# Patient Record
Sex: MALE | Race: BLACK OR AFRICAN AMERICAN | NOT HISPANIC OR LATINO | Employment: PART TIME | URBAN - METROPOLITAN AREA
[De-identification: names, ages, dates, MRNs, and addresses within clinical notes are randomized per-mention and may not be internally consistent; named-entity substitution may affect disease eponyms.]

---

## 2018-09-04 ENCOUNTER — HISTORICAL (OUTPATIENT)
Dept: ADMINISTRATIVE | Facility: HOSPITAL | Age: 23
End: 2018-09-04

## 2018-09-04 LAB
ABS NEUT (OLG): 2.44 X10(3)/MCL (ref 2.1–9.2)
ALBUMIN SERPL-MCNC: 4.6 GM/DL (ref 3.4–5)
ALBUMIN/GLOB SERPL: 1 RATIO (ref 1–2)
ALP SERPL-CCNC: 84 UNIT/L (ref 45–117)
ALT SERPL-CCNC: 53 UNIT/L (ref 12–78)
APPEARANCE, UA: CLEAR
AST SERPL-CCNC: 24 UNIT/L (ref 15–37)
BACTERIA #/AREA URNS AUTO: ABNORMAL /[HPF]
BASOPHILS # BLD AUTO: 0.04 X10(3)/MCL
BASOPHILS NFR BLD AUTO: 1 %
BILIRUB SERPL-MCNC: 0.6 MG/DL (ref 0.2–1)
BILIRUB UR QL STRIP: NEGATIVE
BILIRUBIN DIRECT+TOT PNL SERPL-MCNC: 0.2 MG/DL
BILIRUBIN DIRECT+TOT PNL SERPL-MCNC: 0.4 MG/DL
BUN SERPL-MCNC: 10 MG/DL (ref 7–18)
CALCIUM SERPL-MCNC: 9 MG/DL (ref 8.5–10.1)
CHLORIDE SERPL-SCNC: 102 MMOL/L (ref 98–107)
CHOLEST SERPL-MCNC: 200 MG/DL
CHOLEST/HDLC SERPL: 4.4 {RATIO} (ref 0–5)
CO2 SERPL-SCNC: 24 MMOL/L (ref 21–32)
COLOR UR: YELLOW
CREAT SERPL-MCNC: 0.9 MG/DL (ref 0.6–1.3)
EOSINOPHIL # BLD AUTO: 0.4 X10(3)/MCL
EOSINOPHIL NFR BLD AUTO: 7 %
ERYTHROCYTE [DISTWIDTH] IN BLOOD BY AUTOMATED COUNT: 12.5 % (ref 11.5–14.5)
EST. AVERAGE GLUCOSE BLD GHB EST-MCNC: 111 MG/DL
GLOBULIN SER-MCNC: 3.7 GM/ML (ref 2.3–3.5)
GLUCOSE (UA): NORMAL
GLUCOSE SERPL-MCNC: 94 MG/DL (ref 74–106)
HBA1C MFR BLD: 5.5 % (ref 4.2–6.3)
HCT VFR BLD AUTO: 42.9 % (ref 40–51)
HDLC SERPL-MCNC: 45 MG/DL
HGB BLD-MCNC: 14.9 GM/DL (ref 13.5–17.5)
HGB UR QL STRIP: NEGATIVE
HIV 1+2 AB+HIV1 P24 AG SERPL QL IA: NONREACTIVE
HYALINE CASTS #/AREA URNS LPF: ABNORMAL /[LPF]
IMM GRANULOCYTES # BLD AUTO: 0.03 10*3/UL
IMM GRANULOCYTES NFR BLD AUTO: 0 %
KETONES UR QL STRIP: NEGATIVE
LDLC SERPL CALC-MCNC: 127 MG/DL (ref 0–130)
LEUKOCYTE ESTERASE UR QL STRIP: NEGATIVE
LYMPHOCYTES # BLD AUTO: 2.45 X10(3)/MCL
LYMPHOCYTES NFR BLD AUTO: 42 % (ref 13–40)
MCH RBC QN AUTO: 30.3 PG (ref 26–34)
MCHC RBC AUTO-ENTMCNC: 34.7 GM/DL (ref 31–37)
MCV RBC AUTO: 87.4 FL (ref 80–100)
MONOCYTES # BLD AUTO: 0.52 X10(3)/MCL
MONOCYTES NFR BLD AUTO: 9 % (ref 4–12)
NEUTROPHILS # BLD AUTO: 2.44 X10(3)/MCL
NEUTROPHILS NFR BLD AUTO: 42 X10(3)/MCL
NITRITE UR QL STRIP: NEGATIVE
PH UR STRIP: 6 [PH] (ref 4.5–8)
PLATELET # BLD AUTO: 204 X10(3)/MCL (ref 130–400)
PMV BLD AUTO: 8.9 FL (ref 7.4–10.4)
POTASSIUM SERPL-SCNC: 3.7 MMOL/L (ref 3.5–5.1)
PROT SERPL-MCNC: 8.3 GM/DL (ref 6.4–8.2)
PROT UR QL STRIP: NEGATIVE
RBC # BLD AUTO: 4.91 X10(6)/MCL (ref 4.5–5.9)
RBC #/AREA URNS AUTO: ABNORMAL /[HPF]
SODIUM SERPL-SCNC: 138 MMOL/L (ref 136–145)
SP GR UR STRIP: 1.02 (ref 1–1.03)
SQUAMOUS #/AREA URNS LPF: ABNORMAL /[LPF]
T PALLIDUM AB SER QL: NONREACTIVE
T4 FREE SERPL-MCNC: 0.98 NG/DL (ref 0.76–1.46)
TRIGL SERPL-MCNC: 140 MG/DL
TSH SERPL-ACNC: 1.74 MIU/L (ref 0.36–3.74)
UROBILINOGEN UR STRIP-ACNC: NORMAL
VLDLC SERPL CALC-MCNC: 28 MG/DL
WBC # SPEC AUTO: 5.9 X10(3)/MCL (ref 4.5–11)
WBC #/AREA URNS AUTO: ABNORMAL /HPF

## 2018-12-11 ENCOUNTER — HISTORICAL (OUTPATIENT)
Dept: ADMINISTRATIVE | Facility: HOSPITAL | Age: 23
End: 2018-12-11

## 2018-12-11 LAB
ALBUMIN SERPL-MCNC: 4.7 GM/DL (ref 3.4–5)
ALBUMIN/GLOB SERPL: 1 RATIO (ref 1–2)
ALP SERPL-CCNC: 95 UNIT/L (ref 45–117)
ALT SERPL-CCNC: 33 UNIT/L (ref 12–78)
AST SERPL-CCNC: 14 UNIT/L (ref 15–37)
BILIRUB SERPL-MCNC: 0.8 MG/DL (ref 0.2–1)
BILIRUBIN DIRECT+TOT PNL SERPL-MCNC: 0.2 MG/DL
BILIRUBIN DIRECT+TOT PNL SERPL-MCNC: 0.6 MG/DL
BUN SERPL-MCNC: 11 MG/DL (ref 7–18)
CALCIUM SERPL-MCNC: 9.7 MG/DL (ref 8.5–10.1)
CHLORIDE SERPL-SCNC: 106 MMOL/L (ref 98–107)
CO2 SERPL-SCNC: 28 MMOL/L (ref 21–32)
CREAT SERPL-MCNC: 1.1 MG/DL (ref 0.6–1.3)
GLOBULIN SER-MCNC: 3.9 GM/ML (ref 2.3–3.5)
GLUCOSE SERPL-MCNC: 88 MG/DL (ref 74–106)
MAGNESIUM SERPL-MCNC: 2.1 MG/DL (ref 1.8–2.4)
POTASSIUM SERPL-SCNC: 4.6 MMOL/L (ref 3.5–5.1)
PROT SERPL-MCNC: 8.6 GM/DL (ref 6.4–8.2)
SODIUM SERPL-SCNC: 138 MMOL/L (ref 136–145)
TSH SERPL-ACNC: 0.57 MIU/L (ref 0.36–3.74)

## 2019-09-20 ENCOUNTER — HISTORICAL (OUTPATIENT)
Dept: FAMILY MEDICINE | Facility: CLINIC | Age: 24
End: 2019-09-20

## 2019-09-20 LAB
ABS NEUT (OLG): 3.58 X10(3)/MCL (ref 2.1–9.2)
ALBUMIN SERPL-MCNC: 4.9 GM/DL (ref 3.4–5)
ALBUMIN/GLOB SERPL: 1.4 RATIO (ref 1.1–2)
ALP SERPL-CCNC: 87 UNIT/L (ref 45–117)
ALT SERPL-CCNC: 34 UNIT/L (ref 12–78)
AMPHET UR QL SCN: NEGATIVE
APPEARANCE, UA: CLEAR
AST SERPL-CCNC: 17 UNIT/L (ref 15–37)
BACTERIA #/AREA URNS AUTO: ABNORMAL /[HPF]
BARBITURATE SCN PRESENT UR: NEGATIVE
BASOPHILS # BLD AUTO: 0 X10(3)/MCL (ref 0–0.2)
BASOPHILS NFR BLD AUTO: 0 %
BENZODIAZ UR QL SCN: NEGATIVE
BILIRUB SERPL-MCNC: 0.5 MG/DL (ref 0.2–1)
BILIRUB UR QL STRIP: NEGATIVE
BILIRUBIN DIRECT+TOT PNL SERPL-MCNC: 0.1 MG/DL (ref 0–0.2)
BILIRUBIN DIRECT+TOT PNL SERPL-MCNC: 0.4 MG/DL
BUN SERPL-MCNC: 10 MG/DL (ref 7–18)
CALCIUM SERPL-MCNC: 10 MG/DL (ref 8.5–10.1)
CANNABINOIDS UR QL SCN: POSITIVE
CHLORIDE SERPL-SCNC: 108 MMOL/L (ref 98–107)
CO2 SERPL-SCNC: 27 MMOL/L (ref 21–32)
COCAINE UR QL SCN: NEGATIVE
COLOR UR: NORMAL
CREAT SERPL-MCNC: 0.9 MG/DL (ref 0.6–1.3)
CREAT UR-MCNC: 133 MG/DL
EOSINOPHIL # BLD AUTO: 0.2 X10(3)/MCL (ref 0–0.9)
EOSINOPHIL NFR BLD AUTO: 3 %
ERYTHROCYTE [DISTWIDTH] IN BLOOD BY AUTOMATED COUNT: 12.3 % (ref 11.5–14.5)
GLOBULIN SER-MCNC: 3.4 GM/ML (ref 2.3–3.5)
GLUCOSE (UA): NORMAL
GLUCOSE SERPL-MCNC: 78 MG/DL (ref 74–106)
HAV IGM SERPL QL IA: NONREACTIVE
HBV CORE IGM SERPL QL IA: NONREACTIVE
HBV SURFACE AG SERPL QL IA: NEGATIVE
HCT VFR BLD AUTO: 40.5 % (ref 40–51)
HCV AB SERPL QL IA: NONREACTIVE
HGB BLD-MCNC: 13.4 GM/DL (ref 13.5–17.5)
HGB UR QL STRIP: NEGATIVE
HIV 1+2 AB+HIV1 P24 AG SERPL QL IA: NONREACTIVE
HYALINE CASTS #/AREA URNS LPF: ABNORMAL /[LPF]
IMM GRANULOCYTES # BLD AUTO: 0.03 10*3/UL
IMM GRANULOCYTES NFR BLD AUTO: 0 %
KETONES UR QL STRIP: NEGATIVE
LEUKOCYTE ESTERASE UR QL STRIP: NEGATIVE
LYMPHOCYTES # BLD AUTO: 2.2 X10(3)/MCL (ref 0.6–4.6)
LYMPHOCYTES NFR BLD AUTO: 33 %
MCH RBC QN AUTO: 29.4 PG (ref 26–34)
MCHC RBC AUTO-ENTMCNC: 33.1 GM/DL (ref 31–37)
MCV RBC AUTO: 88.8 FL (ref 80–100)
MONOCYTES # BLD AUTO: 0.6 X10(3)/MCL (ref 0.1–1.3)
MONOCYTES NFR BLD AUTO: 9 %
NEUTROPHILS # BLD AUTO: 3.58 X10(3)/MCL (ref 2.1–9.2)
NEUTROPHILS NFR BLD AUTO: 54 %
NITRITE UR QL STRIP: NEGATIVE
OPIATES UR QL SCN: NEGATIVE
PCP UR QL: NEGATIVE
PH UR STRIP.AUTO: 7 [PH] (ref 5–8)
PH UR STRIP: 7 [PH] (ref 4.5–8)
PLATELET # BLD AUTO: 240 X10(3)/MCL (ref 130–400)
PMV BLD AUTO: 8.8 FL (ref 7.4–10.4)
POTASSIUM SERPL-SCNC: 3.8 MMOL/L (ref 3.5–5.1)
PROT SERPL-MCNC: 8.3 GM/DL (ref 6.4–8.2)
PROT UR QL STRIP: NEGATIVE
PROT UR STRIP-MCNC: 7.6 MG/DL
PROT/CREAT UR-RTO: 57.1 MG/GM
RBC # BLD AUTO: 4.56 X10(6)/MCL (ref 4.5–5.9)
RBC #/AREA URNS AUTO: ABNORMAL /[HPF]
SODIUM SERPL-SCNC: 141 MMOL/L (ref 136–145)
SP GR UR STRIP: 1.01 (ref 1–1.03)
SQUAMOUS #/AREA URNS LPF: ABNORMAL /[LPF]
T PALLIDUM AB SER QL: NONREACTIVE
TEMPERATURE, URINE (OHS): 25 DEGC (ref 20–25)
TSH SERPL-ACNC: 0.91 MIU/L (ref 0.36–3.74)
UROBILINOGEN UR STRIP-ACNC: NORMAL
WBC # SPEC AUTO: 6.7 X10(3)/MCL (ref 4.5–11)
WBC #/AREA URNS AUTO: ABNORMAL /HPF

## 2020-11-13 ENCOUNTER — HISTORICAL (OUTPATIENT)
Dept: ADMINISTRATIVE | Facility: HOSPITAL | Age: 25
End: 2020-11-13

## 2020-11-13 LAB
ABS NEUT (OLG): 3.18 X10(3)/MCL (ref 2.1–9.2)
ALBUMIN SERPL-MCNC: 4.6 GM/DL (ref 3.5–5)
ALBUMIN/GLOB SERPL: 1.6 RATIO (ref 1.1–2)
ALP SERPL-CCNC: 63 UNIT/L (ref 40–150)
ALT SERPL-CCNC: 22 UNIT/L (ref 0–55)
AST SERPL-CCNC: 20 UNIT/L (ref 5–34)
BASOPHILS # BLD AUTO: 0 X10(3)/MCL (ref 0–0.2)
BASOPHILS NFR BLD AUTO: 1 %
BILIRUB SERPL-MCNC: 0.6 MG/DL
BILIRUBIN DIRECT+TOT PNL SERPL-MCNC: 0.2 MG/DL (ref 0–0.5)
BILIRUBIN DIRECT+TOT PNL SERPL-MCNC: 0.4 MG/DL (ref 0–0.8)
BUN SERPL-MCNC: 13 MG/DL (ref 8.9–20.6)
CALCIUM SERPL-MCNC: 9.5 MG/DL (ref 8.4–10.2)
CHLORIDE SERPL-SCNC: 107 MMOL/L (ref 98–107)
CHOLEST SERPL-MCNC: 166 MG/DL
CHOLEST/HDLC SERPL: 4 {RATIO} (ref 0–5)
CO2 SERPL-SCNC: 27 MMOL/L (ref 22–29)
CREAT SERPL-MCNC: 0.88 MG/DL (ref 0.73–1.18)
EOSINOPHIL # BLD AUTO: 0.3 X10(3)/MCL (ref 0–0.9)
EOSINOPHIL NFR BLD AUTO: 5 %
ERYTHROCYTE [DISTWIDTH] IN BLOOD BY AUTOMATED COUNT: 12.8 % (ref 11.5–14.5)
GLOBULIN SER-MCNC: 2.9 GM/DL (ref 2.4–3.5)
GLUCOSE SERPL-MCNC: 87 MG/DL (ref 74–100)
HCT VFR BLD AUTO: 40.1 % (ref 40–51)
HDLC SERPL-MCNC: 39 MG/DL (ref 35–60)
HGB BLD-MCNC: 13.3 GM/DL (ref 13.5–17.5)
HIV 1+2 AB+HIV1 P24 AG SERPL QL IA: NONREACTIVE
IMM GRANULOCYTES # BLD AUTO: 0.02 10*3/UL
IMM GRANULOCYTES NFR BLD AUTO: 0 %
LDLC SERPL CALC-MCNC: 91 MG/DL (ref 50–140)
LYMPHOCYTES # BLD AUTO: 2.2 X10(3)/MCL (ref 0.6–4.6)
LYMPHOCYTES NFR BLD AUTO: 34 %
MCH RBC QN AUTO: 30.1 PG (ref 26–34)
MCHC RBC AUTO-ENTMCNC: 33.2 GM/DL (ref 31–37)
MCV RBC AUTO: 90.7 FL (ref 80–100)
MONOCYTES # BLD AUTO: 0.8 X10(3)/MCL (ref 0.1–1.3)
MONOCYTES NFR BLD AUTO: 12 %
NEUTROPHILS # BLD AUTO: 3.18 X10(3)/MCL (ref 2.1–9.2)
NEUTROPHILS NFR BLD AUTO: 48 %
PLATELET # BLD AUTO: 227 X10(3)/MCL (ref 130–400)
PMV BLD AUTO: 9.5 FL (ref 7.4–10.4)
POTASSIUM SERPL-SCNC: 4.2 MMOL/L (ref 3.5–5.1)
PROT SERPL-MCNC: 7.5 GM/DL (ref 6.4–8.3)
RBC # BLD AUTO: 4.42 X10(6)/MCL (ref 4.5–5.9)
SODIUM SERPL-SCNC: 142 MMOL/L (ref 136–145)
T PALLIDUM AB SER QL: NONREACTIVE
TRIGL SERPL-MCNC: 178 MG/DL (ref 34–140)
VLDLC SERPL CALC-MCNC: 36 MG/DL
WBC # SPEC AUTO: 6.6 X10(3)/MCL (ref 4.5–11)

## 2021-09-10 ENCOUNTER — HISTORICAL (OUTPATIENT)
Dept: ADMINISTRATIVE | Facility: HOSPITAL | Age: 26
End: 2021-09-10

## 2021-09-10 LAB
ALBUMIN SERPL-MCNC: 4.9 GM/DL (ref 3.5–5)
ALBUMIN/GLOB SERPL: 1.2 RATIO (ref 1.1–2)
ALP SERPL-CCNC: 92 UNIT/L (ref 40–150)
ALT SERPL-CCNC: 90 UNIT/L (ref 0–55)
AST SERPL-CCNC: 40 UNIT/L (ref 5–34)
BILIRUB SERPL-MCNC: 0.2 MG/DL
BILIRUBIN DIRECT+TOT PNL SERPL-MCNC: 0.1 MG/DL (ref 0–0.5)
BILIRUBIN DIRECT+TOT PNL SERPL-MCNC: 0.1 MG/DL (ref 0–0.8)
BUN SERPL-MCNC: 13.5 MG/DL (ref 8.9–20.6)
CALCIUM SERPL-MCNC: 10.9 MG/DL (ref 8.4–10.2)
CHLORIDE SERPL-SCNC: 105 MMOL/L (ref 98–107)
CO2 SERPL-SCNC: 27 MMOL/L (ref 22–29)
CREAT SERPL-MCNC: 0.83 MG/DL (ref 0.73–1.18)
GLOBULIN SER-MCNC: 4 GM/DL (ref 2.4–3.5)
GLUCOSE SERPL-MCNC: 96 MG/DL (ref 74–100)
HAV IGM SERPL QL IA: NONREACTIVE
HBV CORE IGM SERPL QL IA: NONREACTIVE
HBV SURFACE AG SERPL QL IA: NONREACTIVE
HCV AB SERPL QL IA: NONREACTIVE
HIV 1+2 AB+HIV1 P24 AG SERPL QL IA: NONREACTIVE
POTASSIUM SERPL-SCNC: 4.5 MMOL/L (ref 3.5–5.1)
PROT SERPL-MCNC: 8.9 GM/DL (ref 6.4–8.3)
PTH-INTACT SERPL-MCNC: 37 PG/ML (ref 8.7–77)
RPR SER QL: REACTIVE
SODIUM SERPL-SCNC: 140 MMOL/L (ref 136–145)
T PALLIDUM AB SER QL: REACTIVE
T3FREE SERPL-MCNC: 3.24 PG/ML (ref 1.71–3.71)
T4 FREE SERPL-MCNC: 0.86 NG/DL (ref 0.7–1.48)
TSH SERPL-ACNC: 0.18 UIU/ML (ref 0.35–4.94)

## 2021-09-13 ENCOUNTER — HISTORICAL (OUTPATIENT)
Dept: ADMINISTRATIVE | Facility: HOSPITAL | Age: 26
End: 2021-09-13

## 2021-09-13 LAB — PROT 24H UR-MCNC: 212 MG/24HR (ref 0–165)

## 2021-09-29 ENCOUNTER — HISTORICAL (OUTPATIENT)
Dept: RADIOLOGY | Facility: HOSPITAL | Age: 26
End: 2021-09-29

## 2021-10-05 ENCOUNTER — HISTORICAL (OUTPATIENT)
Dept: ADMINISTRATIVE | Facility: HOSPITAL | Age: 26
End: 2021-10-05

## 2021-10-05 LAB
ALBUMIN % SPEP (OHS): 52.9 % (ref 48.1–59.5)
ALBUMIN SERPL-MCNC: 5.1 GM/DL (ref 3.5–5)
ALBUMIN SERPL-MCNC: 5.2 GM/DL (ref 3.5–5)
ALBUMIN/GLOB SERPL: 1.5 RATIO (ref 1.1–2)
ALBUMIN/GLOB SERPL: 1.5 RATIO (ref 1.1–2)
ALP SERPL-CCNC: 83 UNIT/L (ref 40–150)
ALPHA 1 GLOB (OHS): 0.21 GM/DL (ref 0–0.4)
ALPHA 1 GLOB% (OHS): 2.42 % (ref 2.3–4.9)
ALPHA 2 GLOB % (OHS): 8.86 % (ref 6.9–13)
ALPHA 2 GLOB (OHS): 0.76 GM/DL (ref 0.4–1)
ALT SERPL-CCNC: 41 UNIT/L (ref 0–55)
APPEARANCE, UA: CLEAR
AST SERPL-CCNC: 24 UNIT/L (ref 5–34)
BACTERIA #/AREA URNS AUTO: ABNORMAL /HPF
BETA GLOB (OHS): 1.45 GM/DL (ref 0.7–1.3)
BETA GLOB% (OHS): 16.85 % (ref 13.8–19.7)
BILIRUB SERPL-MCNC: 0.5 MG/DL
BILIRUB UR QL STRIP: NEGATIVE
BILIRUBIN DIRECT+TOT PNL SERPL-MCNC: 0.2 MG/DL (ref 0–0.5)
BILIRUBIN DIRECT+TOT PNL SERPL-MCNC: 0.3 MG/DL (ref 0–0.8)
BUN SERPL-MCNC: 14.1 MG/DL (ref 8.9–20.6)
C3 SERPL-MCNC: 168 MG/DL (ref 80–173)
C4 SERPL-MCNC: 35.7 MG/DL (ref 13–46)
CALCIUM SERPL-MCNC: 11.8 MG/DL (ref 8.4–10.2)
CHLORIDE SERPL-SCNC: 104 MMOL/L (ref 98–107)
CO2 SERPL-SCNC: 26 MMOL/L (ref 22–29)
COLOR UR: NORMAL
CREAT SERPL-MCNC: 0.95 MG/DL (ref 0.73–1.18)
CREAT UR-MCNC: 112.7 MG/DL (ref 58–161)
GAMMA GLOBULIN % (OHS): 18.96 % (ref 10.1–21.9)
GAMMA GLOBULIN (OHS): 1.63 GM/DL (ref 0.4–1.8)
GLOBULIN SER-MCNC: 3.4 GM/DL (ref 2.4–3.5)
GLOBULIN SER-MCNC: 3.5 GM/DL (ref 2.4–3.5)
GLUCOSE (UA): NEGATIVE
GLUCOSE SERPL-MCNC: 86 MG/DL (ref 74–100)
HGB UR QL STRIP: NEGATIVE
HYALINE CASTS #/AREA URNS LPF: ABNORMAL /LPF
IFE INTERPRETATION (OHS): ABNORMAL
IGA SERPL-MCNC: 88 MG/DL (ref 63–484)
IGG SERPL-MCNC: 1453 MG/DL (ref 240–1822)
IGM SERPL-MCNC: 71 MG/DL (ref 22–240)
KETONES UR QL STRIP: NEGATIVE
LEUKOCYTE ESTERASE UR QL STRIP: NEGATIVE
M SPIKE % (OHS): ABNORMAL
M SPIKE (OHS): ABNORMAL
NITRITE UR QL STRIP: NEGATIVE
PEP GRAPH (OHS): ABNORMAL
PH UR STRIP: 5.5 [PH] (ref 4.5–8)
POTASSIUM SERPL-SCNC: 4.5 MMOL/L (ref 3.5–5.1)
PROT SERPL-MCNC: 8.6 GM/DL (ref 6.4–8.3)
PROT SERPL-MCNC: 8.6 GM/DL (ref 6.4–8.3)
PROT UR QL STRIP: NEGATIVE
PROT UR STRIP-MCNC: <6.8 MG/DL
PROT/CREAT UR-RTO: <60.3 MG/GM CR
RBC #/AREA URNS AUTO: ABNORMAL /HPF
SERINE PROT 3-ARUP: 2 AU/ML
SODIUM SERPL-SCNC: 141 MMOL/L (ref 136–145)
SP GR UR STRIP: 1.01 (ref 1–1.03)
SQUAMOUS #/AREA URNS LPF: ABNORMAL /LPF
UROBILINOGEN UR STRIP-ACNC: NORMAL
WBC #/AREA URNS AUTO: ABNORMAL /HPF

## 2021-10-11 ENCOUNTER — HISTORICAL (OUTPATIENT)
Dept: CARDIOLOGY | Facility: HOSPITAL | Age: 26
End: 2021-10-11

## 2022-02-08 ENCOUNTER — HISTORICAL (OUTPATIENT)
Dept: NEPHROLOGY | Facility: CLINIC | Age: 27
End: 2022-02-08

## 2022-02-08 LAB
ALBUMIN SERPL-MCNC: 5 G/DL (ref 3.5–5)
ALBUMIN/GLOB SERPL: 1.6 {RATIO} (ref 1.1–2)
ALP SERPL-CCNC: 69 U/L (ref 40–150)
ALT SERPL-CCNC: 32 U/L (ref 0–55)
AST SERPL-CCNC: 24 U/L (ref 5–34)
BILIRUB SERPL-MCNC: 0.6 MG/DL
BILIRUBIN DIRECT+TOT PNL SERPL-MCNC: 0.2 (ref 0–0.5)
BILIRUBIN DIRECT+TOT PNL SERPL-MCNC: 0.4 (ref 0–0.8)
BUN SERPL-MCNC: 11.5 MG/DL (ref 8.9–20.6)
CALCIUM SERPL-MCNC: 10.5 MG/DL (ref 8.7–10.5)
CHLORIDE SERPL-SCNC: 105 MMOL/L (ref 98–107)
CO2 SERPL-SCNC: 26 MMOL/L (ref 22–29)
CREAT SERPL-MCNC: 0.89 MG/DL (ref 0.73–1.18)
DEPRECATED CALCIDIOL+CALCIFEROL SERPL-MC: 10.1 NG/ML (ref 30–80)
GLOBULIN SER-MCNC: 3.1 G/DL (ref 2.4–3.5)
GLUCOSE SERPL-MCNC: 86 MG/DL (ref 74–100)
HEMOLYSIS INTERF INDEX SERPL-ACNC: 2
ICTERIC INTERF INDEX SERPL-ACNC: 1
LIPEMIC INTERF INDEX SERPL-ACNC: 6
POTASSIUM SERPL-SCNC: 4.1 MMOL/L (ref 3.5–5.1)
PROT SERPL-MCNC: 8.1 G/DL (ref 6.4–8.3)
PTH-INTACT SERPL-MCNC: 71 PG/ML (ref 8.7–77)
SODIUM SERPL-SCNC: 140 MMOL/L (ref 136–145)
T3FREE SERPL-MCNC: 3.06 PG/ML (ref 1.71–3.71)
T3RU NFR SERPL: 36.11 % (ref 31–39)
T4 FREE SERPL-MCNC: 1.01 NG/DL (ref 0.7–1.48)
TSH SERPL-ACNC: 1.16 M[IU]/L (ref 0.35–4.94)

## 2022-04-10 ENCOUNTER — HISTORICAL (OUTPATIENT)
Dept: ADMINISTRATIVE | Facility: HOSPITAL | Age: 27
End: 2022-04-10
Payer: MEDICAID

## 2022-04-25 ENCOUNTER — HISTORICAL (OUTPATIENT)
Dept: ADMINISTRATIVE | Facility: HOSPITAL | Age: 27
End: 2022-04-25
Payer: MEDICAID

## 2022-04-26 VITALS
DIASTOLIC BLOOD PRESSURE: 88 MMHG | HEIGHT: 71 IN | BODY MASS INDEX: 29.02 KG/M2 | WEIGHT: 207.25 LBS | OXYGEN SATURATION: 100 % | SYSTOLIC BLOOD PRESSURE: 130 MMHG

## 2022-05-26 ENCOUNTER — OFFICE VISIT (OUTPATIENT)
Dept: CARDIOLOGY | Facility: CLINIC | Age: 27
End: 2022-05-26
Payer: MEDICAID

## 2022-05-26 VITALS
OXYGEN SATURATION: 99 % | RESPIRATION RATE: 20 BRPM | WEIGHT: 201.06 LBS | DIASTOLIC BLOOD PRESSURE: 74 MMHG | SYSTOLIC BLOOD PRESSURE: 131 MMHG | TEMPERATURE: 98 F | HEART RATE: 68 BPM | BODY MASS INDEX: 28.15 KG/M2 | HEIGHT: 71 IN

## 2022-05-26 DIAGNOSIS — I10 HTN (HYPERTENSION), BENIGN: Primary | ICD-10-CM

## 2022-05-26 PROBLEM — Z72.0 TOBACCO USE: Status: ACTIVE | Noted: 2022-05-26

## 2022-05-26 PROBLEM — Z72.0 TOBACCO USE: Status: RESOLVED | Noted: 2022-05-26 | Resolved: 2022-05-26

## 2022-05-26 PROCEDURE — 4010F PR ACE/ARB THEARPY RXD/TAKEN: ICD-10-PCS | Mod: CPTII,,, | Performed by: NURSE PRACTITIONER

## 2022-05-26 PROCEDURE — 3075F SYST BP GE 130 - 139MM HG: CPT | Mod: CPTII,,, | Performed by: NURSE PRACTITIONER

## 2022-05-26 PROCEDURE — 1159F MED LIST DOCD IN RCRD: CPT | Mod: CPTII,,, | Performed by: NURSE PRACTITIONER

## 2022-05-26 PROCEDURE — 3008F PR BODY MASS INDEX (BMI) DOCUMENTED: ICD-10-PCS | Mod: CPTII,,, | Performed by: NURSE PRACTITIONER

## 2022-05-26 PROCEDURE — 1160F PR REVIEW ALL MEDS BY PRESCRIBER/CLIN PHARMACIST DOCUMENTED: ICD-10-PCS | Mod: CPTII,,, | Performed by: NURSE PRACTITIONER

## 2022-05-26 PROCEDURE — 1159F PR MEDICATION LIST DOCUMENTED IN MEDICAL RECORD: ICD-10-PCS | Mod: CPTII,,, | Performed by: NURSE PRACTITIONER

## 2022-05-26 PROCEDURE — 99214 OFFICE O/P EST MOD 30 MIN: CPT | Mod: PBBFAC | Performed by: NURSE PRACTITIONER

## 2022-05-26 PROCEDURE — 1160F RVW MEDS BY RX/DR IN RCRD: CPT | Mod: CPTII,,, | Performed by: NURSE PRACTITIONER

## 2022-05-26 PROCEDURE — 3078F PR MOST RECENT DIASTOLIC BLOOD PRESSURE < 80 MM HG: ICD-10-PCS | Mod: CPTII,,, | Performed by: NURSE PRACTITIONER

## 2022-05-26 PROCEDURE — 4010F ACE/ARB THERAPY RXD/TAKEN: CPT | Mod: CPTII,,, | Performed by: NURSE PRACTITIONER

## 2022-05-26 PROCEDURE — 3075F PR MOST RECENT SYSTOLIC BLOOD PRESS GE 130-139MM HG: ICD-10-PCS | Mod: CPTII,,, | Performed by: NURSE PRACTITIONER

## 2022-05-26 PROCEDURE — 3078F DIAST BP <80 MM HG: CPT | Mod: CPTII,,, | Performed by: NURSE PRACTITIONER

## 2022-05-26 PROCEDURE — 99214 PR OFFICE/OUTPT VISIT, EST, LEVL IV, 30-39 MIN: ICD-10-PCS | Mod: S$PBB,,, | Performed by: NURSE PRACTITIONER

## 2022-05-26 PROCEDURE — 3008F BODY MASS INDEX DOCD: CPT | Mod: CPTII,,, | Performed by: NURSE PRACTITIONER

## 2022-05-26 PROCEDURE — 99214 OFFICE O/P EST MOD 30 MIN: CPT | Mod: S$PBB,,, | Performed by: NURSE PRACTITIONER

## 2022-05-26 RX ORDER — AMLODIPINE BESYLATE 5 MG/1
5 TABLET ORAL DAILY
COMMUNITY
End: 2023-03-07

## 2022-05-26 RX ORDER — VALSARTAN 160 MG/1
160 TABLET ORAL DAILY
COMMUNITY
End: 2023-03-07

## 2022-05-26 RX ORDER — METOPROLOL SUCCINATE 25 MG/1
25 TABLET, EXTENDED RELEASE ORAL DAILY
COMMUNITY
End: 2023-03-31 | Stop reason: SDUPTHER

## 2022-05-26 NOTE — PROGRESS NOTES
CHIEF COMPLAINT:   Chief Complaint   Patient presents with    f/u visit, denies chest pains or sob    Hypertension                   Review of patient's allergies indicates:  No Known Allergies                                       HPI:  Dread Gustafson 26 y.o. -American male with a history of hypertension, and history of palpitations. The patient had a 24 hour Holter monitor that revealed average HR 93 NSR, sinus tachycardia 31% of the time, SVT.  Subsequently, the patient was initiated on metoprolol during his last clinic visit.  An echocardiogram completed on 09/29/2021 reveal an intact ejection fraction of 55%, borderline concentric left ventricle hypertrophy.    Today the patient presents without any cardiac complaints of chest pain, shortness of breath, palpitations, orthopnea, PND, peripheral edema, lightheadedness, dizziness or syncope. He states he is able to perform his normal ADLs and regular housework without ischemic symtpoms.  He denies any issues since last seen in cardio clinic.  He reports compliance with his current medications.    9/29 TTE with EF 55%, borderline concentric left ventricular hypertrophy, RVSP 25, trace to mild mitral regurg    10/11/2021 24-hr holter monitor interpretation    Sinus rhythm  Average Vent. rate elevated at 93  Sinus tachycardia 31% of the time   Rare Premature atrial complexes  Rare Premature ventricular complexes  Supraventricular tachycardia noted  No Atrial fibrillation or VT noted  No rhythm strip available for patient marked event  Consider further evaluation and treatment of elevated HR                                                                                                                                                                                                                                                                                                                                                                                 "                                                                  Patient Active Problem List   Diagnosis    HTN (hypertension), benign    Tobacco use     History reviewed. No pertinent surgical history.  Social History     Socioeconomic History    Marital status: Single   Tobacco Use    Smoking status: Never Smoker    Smokeless tobacco: Never Used   Substance and Sexual Activity    Alcohol use: Yes     Alcohol/week: 2.0 standard drinks     Types: 2 Shots of liquor per week    Drug use: Yes     Types: Marijuana    Sexual activity: Yes     Partners: Male        History reviewed. No pertinent family history.      Current Outpatient Medications:     amLODIPine (NORVASC) 5 MG tablet, Take 5 mg by mouth once daily., Disp: , Rfl:     metoprolol succinate (TOPROL-XL) 25 MG 24 hr tablet, Take 25 mg by mouth once daily., Disp: , Rfl:     valsartan (DIOVAN) 160 MG tablet, Take 160 mg by mouth once daily., Disp: , Rfl:      ROS:                                                                                                                                                                             Review of Systems   Constitutional: Negative.    HENT: Negative.    Eyes: Negative.    Respiratory: Negative.    Cardiovascular: Negative.    Gastrointestinal: Negative.    Genitourinary: Negative.    Musculoskeletal: Negative.    Skin: Negative.    Neurological: Negative.    Endo/Heme/Allergies: Negative.    Psychiatric/Behavioral: Negative.         Blood pressure 131/74, pulse 68, temperature 98.2 °F (36.8 °C), resp. rate 20, height 5' 10.87" (1.8 m), weight 91.2 kg (201 lb 1 oz), SpO2 99 %.   PE:  Physical Exam  Constitutional:       Appearance: Normal appearance.   HENT:      Head: Normocephalic.   Eyes:      Pupils: Pupils are equal, round, and reactive to light.   Cardiovascular:      Rate and Rhythm: Normal rate and regular rhythm.      Pulses: Normal pulses.   Pulmonary:      Effort: Pulmonary effort is normal. "   Musculoskeletal:         General: Normal range of motion.   Skin:     General: Skin is warm and dry.   Neurological:      General: No focal deficit present.      Mental Status: He is alert and oriented to person, place, and time.   Psychiatric:         Mood and Affect: Mood normal.         Behavior: Behavior normal.          ASSESSMENT/PLAN:  Tachycardia- resolved   - Denies palpitations or CP  - EF 55%, borderline concentric left ventricular hypertrophy, RVSP 25, trace to mild mitral regurg per ECHO Sept. 2021  - 24-hour Holter monitor results as above, average HR 93 NSR, sinus tachycardia 31% of the time, SVT  - Continue metoprolol succinate 12.5 mg daily    Hypertension  - at goal   - Continue current medications, amlodipine, valsartan and metoprolol  - Counseled on low salt diet       Follow up in cardiology clinic in 6 months or sooner if needed   Follow up with PCP/nephrology as directed

## 2022-07-28 ENCOUNTER — HOSPITAL ENCOUNTER (EMERGENCY)
Facility: HOSPITAL | Age: 27
Discharge: HOME | End: 2022-07-28
Attending: EMERGENCY MEDICINE
Payer: MEDICAID

## 2022-07-28 VITALS
SYSTOLIC BLOOD PRESSURE: 147 MMHG | TEMPERATURE: 98 F | BODY MASS INDEX: 25.2 KG/M2 | HEIGHT: 71 IN | DIASTOLIC BLOOD PRESSURE: 97 MMHG | OXYGEN SATURATION: 98 % | WEIGHT: 180 LBS | RESPIRATION RATE: 18 BRPM | HEART RATE: 88 BPM

## 2022-07-28 DIAGNOSIS — U07.1 COVID-19: Primary | ICD-10-CM

## 2022-07-28 DIAGNOSIS — U07.1 COVID-19 VIRUS DETECTED: ICD-10-CM

## 2022-07-28 LAB
FLUAV AG UPPER RESP QL IA.RAPID: NOT DETECTED
FLUBV AG UPPER RESP QL IA.RAPID: NOT DETECTED
SARS-COV-2 RNA RESP QL NAA+PROBE: DETECTED

## 2022-07-28 PROCEDURE — 99284 EMERGENCY DEPT VISIT MOD MDM: CPT | Mod: 25

## 2022-07-28 PROCEDURE — 87636 SARSCOV2 & INF A&B AMP PRB: CPT | Performed by: EMERGENCY MEDICINE

## 2022-07-28 RX ORDER — PROMETHAZINE HYDROCHLORIDE AND DEXTROMETHORPHAN HYDROBROMIDE 6.25; 15 MG/5ML; MG/5ML
5 SYRUP ORAL EVERY 4 HOURS PRN
Qty: 120 ML | Refills: 0 | Status: SHIPPED | OUTPATIENT
Start: 2022-07-28 | End: 2022-08-07

## 2022-07-28 RX ORDER — IBUPROFEN 800 MG/1
800 TABLET ORAL EVERY 8 HOURS PRN
Qty: 20 TABLET | Refills: 0 | Status: SHIPPED | OUTPATIENT
Start: 2022-07-28 | End: 2023-02-14

## 2022-07-28 NOTE — ED PROVIDER NOTES
Encounter Date: 7/28/2022       History     Chief Complaint   Patient presents with    Sore Throat     Pt c/o sorethroat and runny nose onset last night.     26-year-old male complains of burning to his throat, runny nose, malaise which started last night.  He has had no fever, cough, chest pain, abdominal pain, or other complaints.      Sore Throat   This is a new problem. The sore throat symptoms include sore throat.The current episode started today. The problem has been unchanged. There has been no fever. The pain is at a severity of 4/10. Associated symptoms comments: Runny nose. He has tried nothing for the symptoms.     Review of patient's allergies indicates:  No Known Allergies  Past Medical History:   Diagnosis Date    Carotid artery occlusion      No past surgical history on file.  No family history on file.  Social History     Tobacco Use    Smoking status: Never Smoker    Smokeless tobacco: Never Used   Substance Use Topics    Alcohol use: Yes     Alcohol/week: 2.0 standard drinks     Types: 2 Shots of liquor per week    Drug use: Yes     Types: Marijuana     Review of Systems   HENT: Positive for rhinorrhea and sore throat.    All other systems reviewed and are negative.      Physical Exam     Initial Vitals [07/28/22 0719]   BP Pulse Resp Temp SpO2   (!) 147/97 88 18 97.5 °F (36.4 °C) 98 %      MAP       --         Physical Exam    Constitutional: Vital signs are normal. He appears well-developed and well-nourished.   HENT:   Head: Normocephalic and atraumatic.   Right Ear: External ear normal.   Left Ear: External ear normal.   Nose: Nose normal.   Mouth/Throat: Oropharynx is clear and moist.   Neck: Neck supple.   Cardiovascular: Normal rate, regular rhythm and normal heart sounds. Exam reveals no friction rub.    No murmur heard.  Pulmonary/Chest: Breath sounds normal. No respiratory distress.   Abdominal: Abdomen is soft. There is no abdominal tenderness.   Musculoskeletal:      Cervical  back: Neck supple. No rigidity.      Comments: Ambulatory without assistance, atraumatic     Neurological: He is alert and oriented to person, place, and time.   Skin: Skin is warm, dry and intact.         ED Course   Procedures  Labs Reviewed   COVID/FLU A&B PCR - Abnormal; Notable for the following components:       Result Value    SARS-CoV-2 PCR Detected (*)     All other components within normal limits          Imaging Results    None          Medications - No data to display       Patient is stable for discharge at this time.  All results were discussed and all questions were answered.  Patient will be following up with the primary care provider for further care.  Patient understands that the emergency room provide emergency care and does not represent comprehensive medical evaluation or treatment.                   Clinical Impression:   Final diagnoses:  [U07.1] COVID-19 (Primary)          ED Disposition Condition    Discharge Stable        ED Prescriptions     Medication Sig Dispense Start Date End Date Auth. Provider    promethazine-dextromethorphan (PROMETHAZINE-DM) 6.25-15 mg/5 mL Syrp Take 5 mLs by mouth every 4 (four) hours as needed (cough). 120 mL 7/28/2022 8/7/2022 Faustina Duffy MD    ibuprofen (ADVIL,MOTRIN) 800 MG tablet Take 1 tablet (800 mg total) by mouth every 8 (eight) hours as needed for Pain. 20 tablet 7/28/2022  Faustina Duffy MD        Follow-up Information     Follow up With Specialties Details Why Contact Info    Bimal Flores MD Family Medicine Schedule an appointment as soon as possible for a visit  As needed [address] [facility] [address] [phone]             Faustina Duffy MD  07/28/22 0923

## 2022-07-28 NOTE — Clinical Note
"Dread Evansstevie Gustafson was seen and treated in our emergency department on 7/28/2022.  He may return to work on 08/03/2022.       If you have any questions or concerns, please don't hesitate to call.      Faustina Duffy MD"

## 2022-08-18 ENCOUNTER — OFFICE VISIT (OUTPATIENT)
Dept: ENDOCRINOLOGY | Facility: CLINIC | Age: 27
End: 2022-08-18
Payer: MEDICAID

## 2022-08-18 VITALS
SYSTOLIC BLOOD PRESSURE: 166 MMHG | DIASTOLIC BLOOD PRESSURE: 117 MMHG | TEMPERATURE: 99 F | WEIGHT: 205.81 LBS | BODY MASS INDEX: 28.81 KG/M2 | HEART RATE: 73 BPM | RESPIRATION RATE: 18 BRPM | HEIGHT: 71 IN

## 2022-08-18 DIAGNOSIS — R61 SWEATING INCREASE: ICD-10-CM

## 2022-08-18 DIAGNOSIS — E83.52 HYPERCALCEMIA: ICD-10-CM

## 2022-08-18 DIAGNOSIS — I10 HYPERTENSION, UNSPECIFIED TYPE: Primary | ICD-10-CM

## 2022-08-18 DIAGNOSIS — E05.90 SUBCLINICAL HYPERTHYROIDISM: ICD-10-CM

## 2022-08-18 PROCEDURE — 99214 OFFICE O/P EST MOD 30 MIN: CPT | Mod: PBBFAC

## 2022-08-18 NOTE — PROGRESS NOTES
[facility]    Subjective:        Dread Gustafson is a 26 y.o. male who has a pmh of hypertension and tachycardia who presents to establish care at [facility] endocrinology clinic.  Patient says that he has been having a fast heart rate for quite some time patient is followed by Cardiology had a Holter monitor which showed 31 presented sinus tachycardia.  Patient was put on Toprol XL with clinical effect.  Patient says that he has no issues with constipation or diarrhea.  Patient says that he is also feeling hot at times.  Patient says he has been gaining weight.  Patient denies any tremors.  Patient is followed by Nephrology which ruled out pheo. 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 Patient also says that he does not do any caffeine or illicit drugs except for marijuana. Patient denies any palpitations.     Review of Systems:  10 point ROS negative except for HPI    Objective:   Vital Signs:  Vitals:    08/18/22 0925   BP: (!) 166/117   Pulse:    Resp:    Temp:         Body mass index is 28.81 kg/m².     General:  Well developed, well nourished, no acute respiratory distress  Head: Normocephalic, atraumatic  Eyes: PERRL, anicteric sclera  Throat: No posterior pharyngeal erythema or exudate, no tonsillar exudate  Neck: supple, normal ROM, no JVD  CVS:  RRR, S1 and S2 normal, no murmurs, no added heart sounds, rubs, gallops, regular peripheral pulses, and no peripheral edema  Resp:  Lungs clear to auscultation bilaterally, no wheezes, rales, or rhonci  GI:  Abdomen soft, non-tender, non-distended, normoactive bowel sounds  MSK:  Full range of motion, no obvious deformities   Skin:  No rashes, ulcers, erythema  Neuro:  Alert and oriented x3, No focal neuro deficits, CNII-XII grossly intact  Psych:  Appropriate mood and affect         Laboratory:  Lab Results   Component Value Date    WBC 7.6 09/04/2021    HGB 15.4 09/04/2021    HCT 45.3  09/04/2021     09/04/2021    MCV 86.9 09/04/2021    RDW 12.1 09/04/2021     Lab Results   Component Value Date    HGBA1C 5.5 09/04/2018     09/04/2018    CREATININE 0.89 02/08/2022    CREATRANDUR 112.7 10/05/2021    PROTEINURINE <6.8 10/05/2021    Lab Results   Component Value Date     02/08/2022    K 4.1 02/08/2022    CO2 26 02/08/2022    BUN 11.5 02/08/2022    CREATININE 0.89 02/08/2022    CALCIUM 10.5 02/08/2022    MG 1.90 09/04/2021     Lab Results   Component Value Date    TSH 1.1573 02/08/2022    MYTGNB4SJDN 1.01 02/08/2022            Current Medications:  Current Outpatient Medications   Medication Instructions    amLODIPine (NORVASC) 5 mg, Oral, Daily    ibuprofen (ADVIL,MOTRIN) 800 mg, Oral, Every 8 hours PRN    metoprolol succinate (TOPROL-XL) 25 mg, Oral, Daily    valsartan (DIOVAN) 160 mg, Oral, Daily        Assessment and Plan:        Health Maintenance Due   Topic Date Due    Hepatitis C Screening  Never done    COVID-19 Vaccine (1) Never done    TETANUS VACCINE  Never done        Thyrotoxicosis:  Patient has subclinical hyperthyroidism 09/2021, patient had ultrasound of thyroid which was normal  Patient had repeat thyroid studies in 2/2022 which were within normal limits  Will repeat TSH and T4 free    Tachycardia  Follows cardiology sinus tachycardia patient is on toprol continue  Will get plasma free metanephrines to rule out pheo    Hypertension:  Concentric hypertrophy, on valsartan, norvasc, currently not controlled  Patient misses some doses.   Continue care per PCP and nephrology     Hypercalcemia:  Patient is off his HCTZ  Will repeat vitamin d, urine creatinine/calcium, renal panel       Emily Nieves MD

## 2022-08-31 ENCOUNTER — TELEPHONE (OUTPATIENT)
Dept: ENDOCRINOLOGY | Facility: CLINIC | Age: 27
End: 2022-08-31
Payer: MEDICAID

## 2022-11-30 ENCOUNTER — OFFICE VISIT (OUTPATIENT)
Dept: FAMILY MEDICINE | Facility: CLINIC | Age: 27
End: 2022-11-30
Payer: MEDICAID

## 2022-11-30 VITALS
BODY MASS INDEX: 29.87 KG/M2 | HEART RATE: 70 BPM | TEMPERATURE: 98 F | WEIGHT: 208.63 LBS | OXYGEN SATURATION: 100 % | DIASTOLIC BLOOD PRESSURE: 90 MMHG | SYSTOLIC BLOOD PRESSURE: 157 MMHG | HEIGHT: 70 IN | RESPIRATION RATE: 18 BRPM

## 2022-11-30 DIAGNOSIS — Z20.2 EXPOSURE TO SYPHILIS: ICD-10-CM

## 2022-11-30 DIAGNOSIS — I10 HYPERTENSION, UNSPECIFIED TYPE: Primary | ICD-10-CM

## 2022-11-30 PROBLEM — R51.9 HEADACHE DISORDER: Status: ACTIVE | Noted: 2022-11-30

## 2022-11-30 PROBLEM — Z72.51 HIGH RISK SEXUAL BEHAVIOR: Status: ACTIVE | Noted: 2022-11-30

## 2022-11-30 PROBLEM — L20.9 ATOPIC DERMATITIS: Status: ACTIVE | Noted: 2022-11-30

## 2022-11-30 LAB — T PALLIDUM AB SER QL: REACTIVE

## 2022-11-30 PROCEDURE — 86780 TREPONEMA PALLIDUM: CPT

## 2022-11-30 PROCEDURE — 86592 SYPHILIS TEST NON-TREP QUAL: CPT

## 2022-11-30 PROCEDURE — 36415 COLL VENOUS BLD VENIPUNCTURE: CPT

## 2022-11-30 PROCEDURE — 99214 OFFICE O/P EST MOD 30 MIN: CPT | Mod: PBBFAC

## 2022-11-30 NOTE — PROGRESS NOTES
I have reviewed the Resident's history and physical, assessment, plan, and progress note. I agree with the findings.       Gato Bai MD  [facility]

## 2022-11-30 NOTE — PROGRESS NOTES
"[facility]  Office Visit Note    Subjective:      Patient ID: Dread Gustafson, : 1995.    Chief Complaint: Routine follow up  and Hypertension      26 y.o. male presents for follow-up of hypertension.    HTN:  Taking Norvasc 5 mg, Toprol XL 25 mg, valsartan 160 mg approximately every other day.  Prescribed to take all daily.    Hx syphilis status post treatment with recurrent syphilis exposure:  Had unprotected insertive anal intercourse with male in 2022, was recently notified that this partner had to be treated for syphilis in the hospital.      ROS  Constitutional: Denies weakness or fatigue   Eyes: Denies vision changes   Respiratory: Denies cough or dyspnea   CV: Denies chest pain, palpitations, syncope, or peripheral edema   GI: Denies nausea or vomiting   Neuro: Denies headache or dizziness   Integumentary: Denies rash or lesion      Current Outpatient Medications:     amLODIPine (NORVASC) 5 MG tablet, Take 5 mg by mouth once daily., Disp: , Rfl:     ibuprofen (ADVIL,MOTRIN) 800 MG tablet, Take 1 tablet (800 mg total) by mouth every 8 (eight) hours as needed for Pain. (Patient not taking: Reported on 2022), Disp: 20 tablet, Rfl: 0    metoprolol succinate (TOPROL-XL) 25 MG 24 hr tablet, Take 25 mg by mouth once daily., Disp: , Rfl:     valsartan (DIOVAN) 160 MG tablet, Take 160 mg by mouth once daily., Disp: , Rfl:     Objective:     PHYSICAL EXAM  Blood pressure (!) 157/90, pulse 70, temperature 97.9 °F (36.6 °C), temperature source Oral, resp. rate 18, height 5' 10" (1.778 m), weight 94.6 kg (208 lb 9.6 oz), SpO2 100 %.    General: Pleasant young adult male, alert and oriented, NAD   Eyes: EOMI   Neck: Supple, no thyromegaly   Chest: Respirations nonlabored, CTAB   CV: RRR, distal pulses intact, no peripheral edema  Abdomen: Soft, nontender, nondistended, normoactive bowel sounds, no renal bruit     Assessment:     1. Hypertension, unspecified type    2. Exposure to syphilis         Plan: "     Patient again encouraged to take antihypertensive medications daily as prescribed and keep a home BP log.  Currently asymptomatic.  Will follow-up by telemedicine in 2 3 weeks.    HX of syphilis status post treatment with re-exposure.  RPR with reflex to titer ordered.      Follow up in about 3 weeks (around 12/21/2022) for Uncontrolled HTN (telemed).    Bryce Flores MD  HO-III  [facility]      Orders Placed This Encounter   Procedures    SYPHILIS ANTIBODY (WITH REFLEX RPR)     Standing Status:   Future     Number of Occurrences:   1     Standing Expiration Date:   1/29/2024       New Prescriptions    No medications on file     Discontinued Medications    No medications on file     Modified Medications    No medications on file

## 2022-12-01 LAB
RPR SER QL: NORMAL
RPR SER QL: NORMAL
RPR SER-TITR: NORMAL {TITER}

## 2022-12-04 LAB — T PALLIDUM AB SER QL: REACTIVE

## 2022-12-16 DIAGNOSIS — N18.9 CHRONIC KIDNEY DISEASE, UNSPECIFIED CKD STAGE: Primary | ICD-10-CM

## 2023-02-09 ENCOUNTER — OFFICE VISIT (OUTPATIENT)
Dept: NEPHROLOGY | Facility: CLINIC | Age: 28
End: 2023-02-09
Payer: MEDICAID

## 2023-02-09 VITALS
RESPIRATION RATE: 20 BRPM | DIASTOLIC BLOOD PRESSURE: 79 MMHG | BODY MASS INDEX: 29.2 KG/M2 | TEMPERATURE: 98 F | WEIGHT: 204 LBS | HEIGHT: 70 IN | SYSTOLIC BLOOD PRESSURE: 129 MMHG | OXYGEN SATURATION: 99 % | HEART RATE: 73 BPM

## 2023-02-09 DIAGNOSIS — I10 PRIMARY HYPERTENSION: Primary | ICD-10-CM

## 2023-02-09 PROCEDURE — 99213 OFFICE O/P EST LOW 20 MIN: CPT | Mod: S$PBB,,, | Performed by: NURSE PRACTITIONER

## 2023-02-09 PROCEDURE — 1159F PR MEDICATION LIST DOCUMENTED IN MEDICAL RECORD: ICD-10-PCS | Mod: CPTII,,, | Performed by: NURSE PRACTITIONER

## 2023-02-09 PROCEDURE — 3078F PR MOST RECENT DIASTOLIC BLOOD PRESSURE < 80 MM HG: ICD-10-PCS | Mod: CPTII,,, | Performed by: NURSE PRACTITIONER

## 2023-02-09 PROCEDURE — 4010F PR ACE/ARB THEARPY RXD/TAKEN: ICD-10-PCS | Mod: CPTII,,, | Performed by: NURSE PRACTITIONER

## 2023-02-09 PROCEDURE — 3008F BODY MASS INDEX DOCD: CPT | Mod: CPTII,,, | Performed by: NURSE PRACTITIONER

## 2023-02-09 PROCEDURE — 3074F PR MOST RECENT SYSTOLIC BLOOD PRESSURE < 130 MM HG: ICD-10-PCS | Mod: CPTII,,, | Performed by: NURSE PRACTITIONER

## 2023-02-09 PROCEDURE — 4010F ACE/ARB THERAPY RXD/TAKEN: CPT | Mod: CPTII,,, | Performed by: NURSE PRACTITIONER

## 2023-02-09 PROCEDURE — 99214 OFFICE O/P EST MOD 30 MIN: CPT | Mod: PBBFAC | Performed by: NURSE PRACTITIONER

## 2023-02-09 PROCEDURE — 1159F MED LIST DOCD IN RCRD: CPT | Mod: CPTII,,, | Performed by: NURSE PRACTITIONER

## 2023-02-09 PROCEDURE — 3008F PR BODY MASS INDEX (BMI) DOCUMENTED: ICD-10-PCS | Mod: CPTII,,, | Performed by: NURSE PRACTITIONER

## 2023-02-09 PROCEDURE — 3066F NEPHROPATHY DOC TX: CPT | Mod: CPTII,,, | Performed by: NURSE PRACTITIONER

## 2023-02-09 PROCEDURE — 3066F PR DOCUMENTATION OF TREATMENT FOR NEPHROPATHY: ICD-10-PCS | Mod: CPTII,,, | Performed by: NURSE PRACTITIONER

## 2023-02-09 PROCEDURE — 3074F SYST BP LT 130 MM HG: CPT | Mod: CPTII,,, | Performed by: NURSE PRACTITIONER

## 2023-02-09 PROCEDURE — 99213 PR OFFICE/OUTPT VISIT, EST, LEVL III, 20-29 MIN: ICD-10-PCS | Mod: S$PBB,,, | Performed by: NURSE PRACTITIONER

## 2023-02-09 PROCEDURE — 3078F DIAST BP <80 MM HG: CPT | Mod: CPTII,,, | Performed by: NURSE PRACTITIONER

## 2023-02-09 NOTE — PROGRESS NOTES
"[facility]  Nephrology Clinic Note    Chief complaint: Chronic Kidney Disease (Follow up)    History of present illness:   Dread Gustafson is a 27 y.o. Black or  male with past medical history of hypertension, treated syphilis, and eczema. He was referred to nephrology clinic for evaluation and management of hypertension.   Patient does report strong family history of diabetes and hypertension, remembers being told that his blood pressure was "elevated" at 10 years of age and then consistently throughout his adolescence.  Today he denies complaints. Reports compliance with medication regimen.    Review of Systems  12 point review of systems conducted, negative except as stated in the history of present illness.    Allergies: Patient has No Known Allergies.     Past Medical History:  has a past medical history of Carotid artery occlusion, Hyperthyroidism, and Syphilis, unspecified.    Procedure History:  has no past surgical history on file.    Family History: family history includes Depression in his mother; Hypertension in his mother.    Social History:  reports that he has never smoked. He has never used smokeless tobacco. He reports that he does not currently use alcohol. He reports current drug use. Frequency: 7.00 times per week. Drug: Marijuana.    Physical exam  /79 (BP Location: Left arm, Patient Position: Sitting, BP Method: Large (Automatic))   Pulse 73   Temp 98 °F (36.7 °C) (Oral)   Resp 20   Ht 5' 10" (1.778 m)   Wt 92.5 kg (204 lb)   SpO2 99%   BMI 29.27 kg/m²   General appearance: Patient is in no acute distress.  Skin: No rashes or wounds.  HEENT: PERRLA, EOMI, no scleral icterus, no JVD. Neck is supple.  Chest: Respirations are unlabored. Lungs sounds are clear.   Heart: S1, S2.   Abdomen: Benign.  : Deferred.  Extremities: No edema, peripheral pulses are palpable.   Neuro: No focal deficits.     Home Medications:    Current Outpatient " Medications:     amLODIPine (NORVASC) 5 MG tablet, Take 5 mg by mouth once daily., Disp: , Rfl:     metoprolol succinate (TOPROL-XL) 25 MG 24 hr tablet, Take 25 mg by mouth once daily., Disp: , Rfl:     valsartan (DIOVAN) 160 MG tablet, Take 160 mg by mouth once daily., Disp: , Rfl:     ibuprofen (ADVIL,MOTRIN) 800 MG tablet, Take 1 tablet (800 mg total) by mouth every 8 (eight) hours as needed for Pain., Disp: 20 tablet, Rfl: 0    Laboratory data    Hematology  Lab Results   Component Value Date    WBC 7.6 09/04/2021    HGB 15.4 09/04/2021    HCT 45.3 09/04/2021     09/04/2021     Chemistry  Lab Results   Component Value Date     02/08/2022    K 4.1 02/08/2022    CHLORIDE 105 02/08/2022    CO2 26 02/08/2022    BUN 11.5 02/08/2022    CREATININE 0.89 02/08/2022    GLUCOSE 86 02/08/2022    CALCIUM 10.5 02/08/2022    ALKPHOS 69 02/08/2022    LABPROT 8.1 02/08/2022    ALBUMIN 5.0 02/08/2022    BILIDIR 0.2 02/08/2022    IBILI 0.40 02/08/2022    AST 24 02/08/2022    ALT 32 02/08/2022    MG 1.90 09/04/2021      Urine:  Lab Results   Component Value Date    APPEARANCEUA Clear 10/05/2021    PHUA 6.0 09/04/2021    PROTEINUA Negative 10/05/2021    LEUKOCYTESUR Negative 10/05/2021    RBCUA 0-2 10/05/2021    WBCUA 0-2 10/05/2021    BACTERIA None Seen 10/05/2021    SQEPUA 1-2 10/05/2021    HYALINECASTS 0-2 (A) 10/05/2021    CREATRANDUR 112.7 10/05/2021    PROTEINURINE <6.8 10/05/2021         Lab Results   Component Value Date    HGBA1C 5.5 09/04/2018    GLUCOSE 86 02/08/2022     Lab Results   Component Value Date    PTH 71.0 02/08/2022    HAODDBCT93WL 10.1 02/08/2022        Lab Results   Component Value Date    MSPIKEPCT Not Observed 10/05/2021    HIV Nonreactive 09/10/2021    HEPCAB Nonreactive 09/10/2021    HEPBSURFAG Nonreactive 09/10/2021         Imaging  (09/29/2021 08:06 CDT US Doppler Renal Arteries)    FINDINGS: Right kidney measures 10.6 cm in length. Normal renal  echogenicity and cortical thickness. No  hydronephrosis. Peak systolic  velocity in the right main renal artery is 98 cm/s. Calculated RAR of  0.8. Normal segmental resistive indices. Patent right renal vein.    Left kidney measures 10.1 cm in length. No hydronephrosis. Normal  cortical thickness. Peak systolic velocity in the left main renal  artery is 90 cm/s with calculated RAR of 0.8. Normal segmental  resistive indices. Patent left renal vein.    Incidental hepatic steatosis.    Aortic measurements:  Proximal: 2.1 x 2.1 cm  Mid: 1.7 x 1.9 cm  Distal: 1.5 x 1.3 cm    Nonrenal peak systolic velocities:  Aorta: 118 cm/s  Celiac: 170 cm/s  SMA: 256 cm/s    IMPRESSION:  1. No hemodynamically significant renal artery stenosis by velocity  criteria.  2. Hepatic steatosis.    Impression and plan     Primary hypertension  Patient has been thoroughly evaluated in the past. Doppler ultrasound of renal arteries did not reveal any findings consistent with renal artery stenosis. Serum metanephrine and normetanephrine were below cutoff, 24-hour urine metanephrines and catecholamines were below cutoff as well, 24-hour urine normetanephrine level was 752 mcg.   SPEP revealed no M spike, hepatitis and HIV were negative, GAYE and ANCA were negative as well.  Biochemical tests are not suggestive of pheochromocytoma (urine normetanephrine level needs to be >900 mcg/24 hours). TSH WNL.  Renasight genetic test was negative.  Blood pressure is currently well controlled, continue amlodipine 5 mg daily, metoprolol 25 mg daily, and valsartan 160 mg daily. Serum creatinine is stable.  RTC prn    BMI 30.0-30.9,adult  Lifestyle and dietary interventions discussed, patient counseled on weight loss using portion control, non- sedentary lifestyle, low-carbohydrate/low fat diet.       2/9/2023  Veronica Blackburn NP  [facility]ph[facility]

## 2023-02-10 ENCOUNTER — TELEPHONE (OUTPATIENT)
Dept: NEPHROLOGY | Facility: CLINIC | Age: 28
End: 2023-02-10
Payer: MEDICAID

## 2023-02-10 NOTE — TELEPHONE ENCOUNTER
----- Message from VI Steele sent at 2/10/2023 10:24 AM CST -----  Please let the patient know that kidney function is stable, no changes in treatment plan. Thank you

## 2023-02-14 ENCOUNTER — OFFICE VISIT (OUTPATIENT)
Dept: FAMILY MEDICINE | Facility: CLINIC | Age: 28
End: 2023-02-14
Payer: MEDICAID

## 2023-02-14 VITALS
BODY MASS INDEX: 29.51 KG/M2 | OXYGEN SATURATION: 100 % | TEMPERATURE: 98 F | RESPIRATION RATE: 20 BRPM | HEART RATE: 72 BPM | DIASTOLIC BLOOD PRESSURE: 81 MMHG | SYSTOLIC BLOOD PRESSURE: 129 MMHG | HEIGHT: 70 IN | WEIGHT: 206.13 LBS

## 2023-02-14 DIAGNOSIS — Z11.3 ROUTINE SCREENING FOR STI (SEXUALLY TRANSMITTED INFECTION): Primary | ICD-10-CM

## 2023-02-14 DIAGNOSIS — I10 HYPERTENSION, UNSPECIFIED TYPE: ICD-10-CM

## 2023-02-14 LAB
HAV IGM SERPL QL IA: NONREACTIVE
HBV CORE IGM SERPL QL IA: NONREACTIVE
HBV SURFACE AG SERPL QL IA: NONREACTIVE
HCV AB SERPL QL IA: NONREACTIVE
HIV 1+2 AB+HIV1 P24 AG SERPL QL IA: NONREACTIVE
T PALLIDUM AB SER QL: REACTIVE

## 2023-02-14 PROCEDURE — 99213 OFFICE O/P EST LOW 20 MIN: CPT | Mod: PBBFAC

## 2023-02-14 PROCEDURE — 86592 SYPHILIS TEST NON-TREP QUAL: CPT

## 2023-02-14 PROCEDURE — 86780 TREPONEMA PALLIDUM: CPT

## 2023-02-14 PROCEDURE — 87389 HIV-1 AG W/HIV-1&-2 AB AG IA: CPT

## 2023-02-14 PROCEDURE — 80074 ACUTE HEPATITIS PANEL: CPT

## 2023-02-14 PROCEDURE — 36415 COLL VENOUS BLD VENIPUNCTURE: CPT

## 2023-02-14 RX ORDER — NEBULIZER AND COMPRESSOR
1 EACH MISCELLANEOUS DAILY
Refills: 0 | COMMUNITY
Start: 2023-02-14

## 2023-02-14 NOTE — PROGRESS NOTES
[facility]  Office Visit Note    Subjective:      Patient ID: Dread Gustafson, : 1995.    Chief Complaint: Follow-up (F/u htn ,states pressure been better)    27 y.o. male PMH HTN, atopic dermatitis, high risk sexual behavior, recurrent exposure to syphilis     HTN:    - at goal in clinic, 129/81; last took medications today  - not checking BP at home   - not taking Norvasc 5 mg, Toprol XL 25 mg, valsartan 160 mg consistently; takes all at once  - no BP cuff at home   - drinks etOH several times per month  - smokes marijuana daily     Hx syphilis status post treatment with recurrent syphilis exposure:    - Hx of unprotected insertive anal intercourse   - notified that this partner had to be treated for syphilis in the hospital.  - HIV, Hep A/B/C WNL 2021  - RPR neg, AB pos 2022 after partner pos, s/p PCN qweek for 3 weeks per pt   - declines PreP despite risk vs benefit conversation  - advised safer sex practices    ROS  Denies HA, dizziness, vision changes, rashes, penile DC, rash on hands or feet, lesions elsewhere on genitalia  Objective:     PHYSICAL EXAM  Vitals:    23 1058   BP: 129/81   Pulse: 72   Resp: 20   Temp: 98.2 °F (36.8 °C)     General: no acute distress  CVS: RRR no mumru. Nondisplaced PMI. Radial pulses 2+ BL   Resp: CTA  : declines genital exam   Integ: no rashes on hands or feet     Assessment:     1. Routine screening for STI (sexually transmitted infection)    2. Hypertension, unspecified type        Plan:     encouraged to take one antihypertensive medication daily   Rx for cuff sent   keep a home BP log  Watch salt intake, decr smoking and etOH  CMP WNF 2023    HX of syphilis status post treatment with re-exposure.    HIV, RPR, Hep A/B/C ordered   Referred to [facility]  Advised PreP  Advised safer sex practices incl barrier methods , monogamy if possible, knowing partner status  STD prevention, barrier methods info discussed and dist    RTC in 6w with PCP to readdress  above concerns     KIM Bai MD III  \[facility]\"" Family [facility]

## 2023-02-15 LAB
PATH REV: NORMAL
RPR SER QL: NORMAL
RPR SER-TITR: NORMAL {TITER}

## 2023-02-15 NOTE — PROGRESS NOTES
Date of encounter (02-14-23).  Resident's note reviewed 02-15-23.  Agree with assessment; plan of care appropriate.  Professional services provided in an outpatient primary care center affiliated with a teaching institution.

## 2023-02-17 LAB — T PALLIDUM AB SER QL: REACTIVE

## 2023-03-31 ENCOUNTER — OFFICE VISIT (OUTPATIENT)
Dept: FAMILY MEDICINE | Facility: CLINIC | Age: 28
End: 2023-03-31
Payer: MEDICAID

## 2023-03-31 VITALS
RESPIRATION RATE: 18 BRPM | HEART RATE: 78 BPM | TEMPERATURE: 98 F | SYSTOLIC BLOOD PRESSURE: 131 MMHG | HEIGHT: 71 IN | DIASTOLIC BLOOD PRESSURE: 78 MMHG | OXYGEN SATURATION: 98 % | WEIGHT: 201 LBS | BODY MASS INDEX: 28.14 KG/M2

## 2023-03-31 DIAGNOSIS — Z28.21 IMMUNIZATION DECLINED: Primary | ICD-10-CM

## 2023-03-31 DIAGNOSIS — I10 ESSENTIAL (PRIMARY) HYPERTENSION: ICD-10-CM

## 2023-03-31 PROCEDURE — 99214 OFFICE O/P EST MOD 30 MIN: CPT | Mod: PBBFAC

## 2023-03-31 RX ORDER — METOPROLOL SUCCINATE 25 MG/1
25 TABLET, EXTENDED RELEASE ORAL DAILY
Qty: 90 TABLET | Refills: 4 | Status: SHIPPED | OUTPATIENT
Start: 2023-03-31 | End: 2023-06-29

## 2023-03-31 RX ORDER — VALSARTAN 160 MG/1
TABLET ORAL
Qty: 90 TABLET | Refills: 3 | Status: SHIPPED | OUTPATIENT
Start: 2023-03-31

## 2023-03-31 RX ORDER — AMLODIPINE BESYLATE 5 MG/1
TABLET ORAL
Qty: 90 TABLET | Refills: 4 | Status: SHIPPED | OUTPATIENT
Start: 2023-03-31

## 2023-03-31 NOTE — PROGRESS NOTES
[facility]     28 y/o male here for routine f/u.     Acute issues/CC:  No issues or complaints     Chronic issues:  HTN   -Currently taking Norvasc 5 mg, Metoprolol 25 mg, Valsartan 160 mg  -Checks BP at home, and denies any BP readings >140/90  -Denies chest pain, SOB, abdominal pain, N/V     Social: Denies tobacco. Drinks brown liquor once a week, a glass/week. Smokes marijuana daily   Works full time at [employer]   See above     PE  Vitals:    03/31/23 0843   BP: 131/78   Pulse: 78   Resp: 18   Temp: 98.2 °F (36.8 °C)   General: Pleasant and cooperative male in no acute distress   Lungs: Clear to auscultation bilaterally   Heart: RRR      A/P   HTN   -Better controlled   -Continue current regimen   -Recommend Mediterranean diet   -Encouraged regular aerobic exercise for at least 150 ins/week     Immunization Declined   -Went over benefits of Flu vaccine, and he declined

## 2023-04-03 NOTE — PROGRESS NOTES
Date of encounter 03-31-23.  Resident's note reviewed 04-02-23.  Agree with assessment; plan of care appropriate.  Professional services provided in an outpatient primary care center affiliated with a teaching institution.

## 2023-11-07 ENCOUNTER — OFFICE VISIT (OUTPATIENT)
Dept: FAMILY MEDICINE | Facility: CLINIC | Age: 28
End: 2023-11-07
Payer: MEDICAID

## 2023-11-07 VITALS
BODY MASS INDEX: 28.42 KG/M2 | WEIGHT: 203 LBS | HEIGHT: 71 IN | DIASTOLIC BLOOD PRESSURE: 79 MMHG | HEART RATE: 65 BPM | TEMPERATURE: 99 F | OXYGEN SATURATION: 99 % | SYSTOLIC BLOOD PRESSURE: 122 MMHG

## 2023-11-07 DIAGNOSIS — I10 PRIMARY HYPERTENSION: Primary | ICD-10-CM

## 2023-11-07 DIAGNOSIS — R53.83 FATIGUE, UNSPECIFIED TYPE: ICD-10-CM

## 2023-11-07 PROCEDURE — 99214 OFFICE O/P EST MOD 30 MIN: CPT | Mod: PBBFAC | Performed by: STUDENT IN AN ORGANIZED HEALTH CARE EDUCATION/TRAINING PROGRAM

## 2023-11-07 RX ORDER — TRIAMCINOLONE ACETONIDE 1 MG/G
CREAM TOPICAL 2 TIMES DAILY
Qty: 45 G | Refills: 2 | Status: SHIPPED | OUTPATIENT
Start: 2023-11-07

## 2023-11-07 NOTE — PROGRESS NOTES
"[facility] Office Visit Note    Subjective:       Patient ID: Dread Gustafson is a 27 y.o. male.    Chief Complaint: Follow-up    HPI:  27 y.o. male presents to [facility] clinic for h/u HTN and general well visit.    HTN   -Currently taking Norvasc 5 mg, Metoprolol 25 mg, Valsartan 160 mg  -Checks BP at home, and denies any BP readings >140/90  - was eval'd by [facility] Neph[facility] due to young age of onset, told to f/u prn (see note 2/9/2023)  -Denies chest pain, SOB, abdominal pain, N/V     Social: Works full time at [employer]    Review of Systems:  Gen: denies fever and chills  Resp: denies cough, shortness of breath and wheezing  CV: denies chest pain and palpitations  GI: denies nausea, vomiting, abdominal pain    Objective:      /79 (BP Location: Left arm, Patient Position: Sitting, BP Method: Large (Automatic))   Pulse 65   Temp 98.5 °F (36.9 °C) (Oral)   Ht 5' 11" (1.803 m)   Wt 92.1 kg (203 lb)   SpO2 99%   BMI 28.31 kg/m²     Physical Exam:  Gen: alert and oriented, NAD  CV: RRR, S1 and S2 present, no murmurs appreciated on exam  Resp: CTA bilaterally, breathing nonlabored on room air   Abd: Soft, non-distended, non-tender    Current Outpatient Medications   Medication Instructions    amLODIPine (NORVASC) 5 MG tablet TAKE ONE TABLET once a day    BLOOD PRESSURE CUFF Misc 1 each, Misc.(Non-Drug; Combo Route), Daily    metoprolol succinate (TOPROL-XL) 25 mg, Oral, Daily    triamcinolone acetonide 0.1% (KENALOG) 0.1 % cream Topical (Top), 2 times daily    valsartan (DIOVAN) 160 MG tablet TAKE ONE TABLET once a day        Assessment/Plan:     1. Primary hypertension  Comprehensive Metabolic Panel    TSH    T4, Free      2. Fatigue, unspecified type  CBC Auto Differential    TSH    T4, Free        BP at goal. Labs for fatigue and electrolytes on arb therapy    "

## 2023-11-17 NOTE — PROGRESS NOTES
Faculty addendum: Patient discussed with resident. Chart was reviewed including vitals, labs, etc. Care provided reasonable and necessary. I participated in the management of the patient and was immediately available throughout the encounter. Services were furnished in a primary care center located in the outpatient department of a [facility] hospital. I agree with the resident's findings and plan as documented in the resident's note.

## 2024-02-02 ENCOUNTER — OFFICE VISIT (OUTPATIENT)
Dept: URGENT CARE | Facility: CLINIC | Age: 29
End: 2024-02-02
Payer: MEDICAID

## 2024-02-02 VITALS
BODY MASS INDEX: 28.42 KG/M2 | DIASTOLIC BLOOD PRESSURE: 92 MMHG | RESPIRATION RATE: 18 BRPM | SYSTOLIC BLOOD PRESSURE: 148 MMHG | OXYGEN SATURATION: 98 % | WEIGHT: 203 LBS | HEART RATE: 71 BPM | HEIGHT: 71 IN | TEMPERATURE: 98 F

## 2024-02-02 DIAGNOSIS — J04.0 LARYNGITIS: Primary | ICD-10-CM

## 2024-02-02 DIAGNOSIS — J02.9 SORE THROAT: ICD-10-CM

## 2024-02-02 DIAGNOSIS — R09.81 NASAL CONGESTION: ICD-10-CM

## 2024-02-02 DIAGNOSIS — J02.9 ACUTE VIRAL PHARYNGITIS: ICD-10-CM

## 2024-02-02 LAB
CTP QC/QA: YES
MOLECULAR STREP A: NEGATIVE
POC MOLECULAR INFLUENZA A AGN: NEGATIVE
POC MOLECULAR INFLUENZA B AGN: NEGATIVE
SARS-COV-2 RDRP RESP QL NAA+PROBE: NEGATIVE

## 2024-02-02 PROCEDURE — 87635 SARS-COV-2 COVID-19 AMP PRB: CPT | Mod: PBBFAC

## 2024-02-02 PROCEDURE — 99214 OFFICE O/P EST MOD 30 MIN: CPT | Mod: S$PBB,,,

## 2024-02-02 PROCEDURE — 87651 STREP A DNA AMP PROBE: CPT | Mod: PBBFAC

## 2024-02-02 PROCEDURE — 63600175 PHARM REV CODE 636 W HCPCS

## 2024-02-02 PROCEDURE — 87502 INFLUENZA DNA AMP PROBE: CPT | Mod: PBBFAC

## 2024-02-02 PROCEDURE — 99214 OFFICE O/P EST MOD 30 MIN: CPT | Mod: PBBFAC

## 2024-02-02 RX ORDER — FLUTICASONE PROPIONATE 50 MCG
1 SPRAY, SUSPENSION (ML) NASAL DAILY
Qty: 9.9 ML | Refills: 0 | Status: SHIPPED | OUTPATIENT
Start: 2024-02-02

## 2024-02-02 RX ORDER — DEXAMETHASONE SODIUM PHOSPHATE 100 MG/10ML
10 INJECTION INTRAMUSCULAR; INTRAVENOUS ONCE
Status: COMPLETED | OUTPATIENT
Start: 2024-02-02 | End: 2024-02-02

## 2024-02-02 RX ADMIN — DEXAMETHASONE SODIUM PHOSPHATE 10 MG: 10 INJECTION INTRAMUSCULAR; INTRAVENOUS at 08:02

## 2024-02-02 NOTE — LETTER
February 2, 2024      [facility]  [facility] [facility] [address]  Phone: [phone]       Patient: Dread Gustafson   YOB: 1995  Date of Visit: 02/02/2024    To Whom It May Concern:    Qamar Gustafson  was at [facility] on 02/02/2024. The patient may return to work/school on 02/03/2024 with no restrictions. If you have any questions or concerns, or if I can be of further assistance, please do not hesitate to contact me.    Sincerely,    VI Smith

## 2024-02-03 NOTE — PROGRESS NOTES
"Subjective:       Patient ID: Dread Gustafson is a 28 y.o. male.    Vitals:  height is 5' 11" (1.803 m) and weight is 92.1 kg (203 lb). His oral temperature is 98.4 °F (36.9 °C). His blood pressure is 148/92 (abnormal) and his pulse is 71. His respiration is 18 and oxygen saturation is 98%.     Chief Complaint: Sore Throat (Patient reports cough, sore throat, runny nose x 3 days )    28-year-old  male reports symptoms times 3 days.  States developed hoarseness on today.  Reports exposed to COVID by co-worker.  Has been taking TheraFlu with improvement in symptoms.      Sore Throat   Associated symptoms include congestion and coughing. Pertinent negatives include no ear pain, headaches, shortness of breath, trouble swallowing or vomiting.       Constitution: Negative for chills and fever.   HENT:  Positive for congestion, sore throat and voice change. Negative for ear pain and trouble swallowing.    Cardiovascular:  Negative for chest pain.   Respiratory:  Positive for cough. Negative for sputum production and shortness of breath.    Gastrointestinal:  Negative for nausea and vomiting.   Neurological:  Negative for headaches.       Objective:      Physical Exam   Constitutional: He is oriented to person, place, and time. He is cooperative. He is easily aroused. He does not appear ill. No distress. awake  HENT:   Head: Normocephalic and atraumatic.   Ears:   Right Ear: Tympanic membrane normal.   Left Ear: Tympanic membrane normal.   Nose: Mucosal edema and rhinorrhea present. Right sinus exhibits no maxillary sinus tenderness and no frontal sinus tenderness. Left sinus exhibits no maxillary sinus tenderness and no frontal sinus tenderness.   Mouth/Throat: Uvula is midline, oropharynx is clear and moist and mucous membranes are normal. Tonsils are 2+ on the right. Tonsils are 2+ on the left. No tonsillar exudate.   Left nasal turbinate is swollen      Comments: Left nasal turbinate is swollen  Eyes: " Conjunctivae and lids are normal.   Neck: Trachea normal. Neck supple.      Comments: +hoarseness    Cardiovascular: Normal rate, regular rhythm, S1 normal, S2 normal and normal heart sounds.   Pulmonary/Chest: Breath sounds normal.   Abdominal: Normal appearance.   Lymphadenopathy:     He has no cervical adenopathy.   Neurological: He is alert, oriented to person, place, and time and easily aroused. GCS eye subscore is 4. GCS verbal subscore is 5. GCS motor subscore is 6.   Skin: Skin is warm, dry and intact. Capillary refill takes less than 2 seconds.   Psychiatric: His behavior is normal.      Comments: +hoarseness   Nursing note and vitals reviewed.        Assessment:       1. Laryngitis    2. Acute viral pharyngitis    3. Nasal congestion    4. Sore throat          Plan:     All testing are negative today in clinic.  We will offer Decadron injection for laryngitis symptoms.  Warm patient to continue with TheraFlu as directed, may take Tylenol/Motrin as needed for pain or fever.  If symptoms does not improve in 1 week follow up with your primary care doctor.    Laryngitis  -     dexAMETHasone injection 10 mg    Acute viral pharyngitis    Nasal congestion  -     fluticasone propionate (FLONASE) 50 mcg/actuation nasal spray; 1 spray (50 mcg total) by Each Nostril route once daily.  Dispense: 9.9 mL; Refill: 0    Sore throat  -     POCT Strep A, Molecular  -     POCT Influenza A/B MOLECULAR  -     POCT COVID-19 Rapid Screening           Results for orders placed or performed in visit on 02/02/24   POCT Strep A, Molecular   Result Value Ref Range    Molecular Strep A, POC Negative Negative     Acceptable Yes    POCT Influenza A/B MOLECULAR   Result Value Ref Range    POC Molecular Influenza A Ag Negative Negative, Not Reported    POC Molecular Influenza B Ag Negative Negative, Not Reported     Acceptable Yes    POCT COVID-19 Rapid Screening   Result Value Ref Range    POC Rapid COVID  Negative Negative     Acceptable Yes

## 2024-02-15 ENCOUNTER — OFFICE VISIT (OUTPATIENT)
Dept: FAMILY MEDICINE | Facility: CLINIC | Age: 29
End: 2024-02-15
Payer: MEDICAID

## 2024-02-15 VITALS
OXYGEN SATURATION: 98 % | SYSTOLIC BLOOD PRESSURE: 140 MMHG | HEIGHT: 71 IN | RESPIRATION RATE: 16 BRPM | WEIGHT: 201 LBS | HEART RATE: 68 BPM | BODY MASS INDEX: 28.14 KG/M2 | TEMPERATURE: 99 F | DIASTOLIC BLOOD PRESSURE: 98 MMHG

## 2024-02-15 DIAGNOSIS — Z00.00 HEALTHCARE MAINTENANCE: ICD-10-CM

## 2024-02-15 DIAGNOSIS — I10 PRIMARY HYPERTENSION: Primary | ICD-10-CM

## 2024-02-15 PROCEDURE — 99214 OFFICE O/P EST MOD 30 MIN: CPT | Mod: PBBFAC | Performed by: STUDENT IN AN ORGANIZED HEALTH CARE EDUCATION/TRAINING PROGRAM

## 2024-02-26 NOTE — PROGRESS NOTES
"[facility] Office Visit Note    Subjective:       Patient ID: Dread Gustafson is a 28 y.o. male.    Chief Complaint: Hypertension      HPI:  28 y.o. male presents to [facility] clinic for f/u chronic conditions, HCM.    HTN   -Currently taking Norvasc 5 mg, Metoprolol 25 mg, Valsartan 160 mg. Took meds this am. Reports seldom missed doses  - has not been checking BP at home  - was eval'd by [facility] due to young age of onset, told to f/u prn (see note 2/9/2023)  -Denies lightheadedness, dizziness, vision changes, chest pain, SOB, abdominal pain, N/V      Social: Works full time at [employer]    Review of Systems:  Gen: denies fever and chills  Resp: denies cough, shortness of breath and wheezing  CV: denies chest pain and palpitations  GI: denies nausea, vomiting, abdominal pain    Objective:      BP (!) 140/98 (BP Location: Right arm, Patient Position: Sitting, BP Method: Large (Manual))   Pulse 68   Temp 98.6 °F (37 °C) (Oral)   Resp 16   Ht 5' 11" (1.803 m)   Wt 91.2 kg (201 lb)   SpO2 98%   BMI 28.03 kg/m²   Physical Exam:  Gen: alert and oriented, NAD  CV: RRR, S1 and S2 present, no murmurs appreciated on exam  Resp: CTA bilaterally, breathing nonlabored on room air   Abd: Soft, non-distended, non-tender  MSK: ambulating spontaneously with appropriate ROM in all extremities    Current Outpatient Medications   Medication Instructions    amLODIPine (NORVASC) 5 MG tablet TAKE ONE TABLET once a day    BLOOD PRESSURE CUFF Misc 1 each, Misc.(Non-Drug; Combo Route), Daily    fluticasone propionate (FLONASE) 50 mcg, Each Nostril, Daily    metoprolol succinate (TOPROL-XL) 25 mg, Oral, Daily    triamcinolone acetonide 0.1% (KENALOG) 0.1 % cream Topical (Top), 2 times daily    valsartan (DIOVAN) 160 MG tablet TAKE ONE TABLET once a day        Assessment/Plan:     1. Primary hypertension    2. Healthcare maintenance      BP above goal on manual repeat. Discussed keeping BP logs. Pt " instructed to bring BP cuff and machine to office. Rtc 2wks for bp check  Pt otherwise up to date on preventative care for age group     Follow up in about 2 weeks (around 2/29/2024) for bp check.

## 2024-03-01 ENCOUNTER — OFFICE VISIT (OUTPATIENT)
Dept: FAMILY MEDICINE | Facility: CLINIC | Age: 29
End: 2024-03-01
Payer: MEDICAID

## 2024-03-01 VITALS
BODY MASS INDEX: 28.98 KG/M2 | TEMPERATURE: 98 F | HEIGHT: 71 IN | OXYGEN SATURATION: 98 % | DIASTOLIC BLOOD PRESSURE: 89 MMHG | SYSTOLIC BLOOD PRESSURE: 136 MMHG | WEIGHT: 207 LBS | HEART RATE: 66 BPM

## 2024-03-01 DIAGNOSIS — I10 PRIMARY HYPERTENSION: Primary | ICD-10-CM

## 2024-03-01 DIAGNOSIS — Z71.1 CONCERN ABOUT STD IN MALE WITHOUT DIAGNOSIS: ICD-10-CM

## 2024-03-01 DIAGNOSIS — Z13.220 SCREENING CHOLESTEROL LEVEL: ICD-10-CM

## 2024-03-01 LAB
ALBUMIN SERPL-MCNC: 5 G/DL (ref 3.5–5)
ALBUMIN/GLOB SERPL: 1.5 RATIO (ref 1.1–2)
ALP SERPL-CCNC: 82 UNIT/L (ref 40–150)
ALT SERPL-CCNC: 37 UNIT/L (ref 0–55)
AST SERPL-CCNC: 22 UNIT/L (ref 5–34)
BASOPHILS # BLD AUTO: 0.04 X10(3)/MCL
BASOPHILS NFR BLD AUTO: 0.7 %
BILIRUB SERPL-MCNC: 0.5 MG/DL
BUN SERPL-MCNC: 13.4 MG/DL (ref 8.9–20.6)
CALCIUM SERPL-MCNC: 10.2 MG/DL (ref 8.4–10.2)
CHLORIDE SERPL-SCNC: 108 MMOL/L (ref 98–107)
CHOLEST SERPL-MCNC: 250 MG/DL
CHOLEST/HDLC SERPL: 7 {RATIO} (ref 0–5)
CO2 SERPL-SCNC: 23 MMOL/L (ref 22–29)
CREAT SERPL-MCNC: 0.98 MG/DL (ref 0.73–1.18)
EOSINOPHIL # BLD AUTO: 0.36 X10(3)/MCL (ref 0–0.9)
EOSINOPHIL NFR BLD AUTO: 6.3 %
ERYTHROCYTE [DISTWIDTH] IN BLOOD BY AUTOMATED COUNT: 12.8 % (ref 11.5–17)
GFR SERPLBLD CREATININE-BSD FMLA CKD-EPI: >60 MLS/MIN/1.73/M2
GLOBULIN SER-MCNC: 3.3 GM/DL (ref 2.4–3.5)
GLUCOSE SERPL-MCNC: 75 MG/DL (ref 74–100)
HAV IGM SERPL QL IA: NONREACTIVE
HBV CORE IGM SERPL QL IA: NONREACTIVE
HBV SURFACE AG SERPL QL IA: NONREACTIVE
HCT VFR BLD AUTO: 43.5 % (ref 42–52)
HCV AB SERPL QL IA: NONREACTIVE
HDLC SERPL-MCNC: 37 MG/DL (ref 35–60)
HGB BLD-MCNC: 14.5 G/DL (ref 14–18)
HIV 1+2 AB+HIV1 P24 AG SERPL QL IA: NONREACTIVE
IMM GRANULOCYTES # BLD AUTO: 0.03 X10(3)/MCL (ref 0–0.04)
IMM GRANULOCYTES NFR BLD AUTO: 0.5 %
LDLC SERPL CALC-MCNC: 191 MG/DL (ref 50–140)
LYMPHOCYTES # BLD AUTO: 2.23 X10(3)/MCL (ref 0.6–4.6)
LYMPHOCYTES NFR BLD AUTO: 39.2 %
MCH RBC QN AUTO: 29.9 PG (ref 27–31)
MCHC RBC AUTO-ENTMCNC: 33.3 G/DL (ref 33–36)
MCV RBC AUTO: 89.7 FL (ref 80–94)
MONOCYTES # BLD AUTO: 0.46 X10(3)/MCL (ref 0.1–1.3)
MONOCYTES NFR BLD AUTO: 8.1 %
NEUTROPHILS # BLD AUTO: 2.57 X10(3)/MCL (ref 2.1–9.2)
NEUTROPHILS NFR BLD AUTO: 45.2 %
NRBC BLD AUTO-RTO: 0 %
PLATELET # BLD AUTO: 300 X10(3)/MCL (ref 130–400)
PMV BLD AUTO: 9.2 FL (ref 7.4–10.4)
POTASSIUM SERPL-SCNC: 4.5 MMOL/L (ref 3.5–5.1)
PROT SERPL-MCNC: 8.3 GM/DL (ref 6.4–8.3)
RBC # BLD AUTO: 4.85 X10(6)/MCL (ref 4.7–6.1)
SODIUM SERPL-SCNC: 141 MMOL/L (ref 136–145)
T PALLIDUM AB SER QL: REACTIVE
T4 FREE SERPL-MCNC: 0.85 NG/DL (ref 0.7–1.48)
TRIGL SERPL-MCNC: 109 MG/DL (ref 34–140)
TSH SERPL-ACNC: 0.47 UIU/ML (ref 0.35–4.94)
VLDLC SERPL CALC-MCNC: 22 MG/DL
WBC # SPEC AUTO: 5.69 X10(3)/MCL (ref 4.5–11.5)

## 2024-03-01 PROCEDURE — 86780 TREPONEMA PALLIDUM: CPT | Performed by: STUDENT IN AN ORGANIZED HEALTH CARE EDUCATION/TRAINING PROGRAM

## 2024-03-01 PROCEDURE — 86592 SYPHILIS TEST NON-TREP QUAL: CPT | Performed by: STUDENT IN AN ORGANIZED HEALTH CARE EDUCATION/TRAINING PROGRAM

## 2024-03-01 PROCEDURE — 80053 COMPREHEN METABOLIC PANEL: CPT | Performed by: STUDENT IN AN ORGANIZED HEALTH CARE EDUCATION/TRAINING PROGRAM

## 2024-03-01 PROCEDURE — 80074 ACUTE HEPATITIS PANEL: CPT | Performed by: STUDENT IN AN ORGANIZED HEALTH CARE EDUCATION/TRAINING PROGRAM

## 2024-03-01 PROCEDURE — 87389 HIV-1 AG W/HIV-1&-2 AB AG IA: CPT | Performed by: STUDENT IN AN ORGANIZED HEALTH CARE EDUCATION/TRAINING PROGRAM

## 2024-03-01 PROCEDURE — 80061 LIPID PANEL: CPT | Performed by: STUDENT IN AN ORGANIZED HEALTH CARE EDUCATION/TRAINING PROGRAM

## 2024-03-01 PROCEDURE — 86780 TREPONEMA PALLIDUM: CPT | Mod: 91 | Performed by: STUDENT IN AN ORGANIZED HEALTH CARE EDUCATION/TRAINING PROGRAM

## 2024-03-01 PROCEDURE — 84439 ASSAY OF FREE THYROXINE: CPT | Performed by: STUDENT IN AN ORGANIZED HEALTH CARE EDUCATION/TRAINING PROGRAM

## 2024-03-01 PROCEDURE — 85025 COMPLETE CBC W/AUTO DIFF WBC: CPT | Performed by: STUDENT IN AN ORGANIZED HEALTH CARE EDUCATION/TRAINING PROGRAM

## 2024-03-01 PROCEDURE — 84443 ASSAY THYROID STIM HORMONE: CPT | Performed by: STUDENT IN AN ORGANIZED HEALTH CARE EDUCATION/TRAINING PROGRAM

## 2024-03-01 PROCEDURE — 36415 COLL VENOUS BLD VENIPUNCTURE: CPT | Performed by: STUDENT IN AN ORGANIZED HEALTH CARE EDUCATION/TRAINING PROGRAM

## 2024-03-01 PROCEDURE — 99213 OFFICE O/P EST LOW 20 MIN: CPT | Mod: PBBFAC | Performed by: STUDENT IN AN ORGANIZED HEALTH CARE EDUCATION/TRAINING PROGRAM

## 2024-03-01 NOTE — PROGRESS NOTES
"[facility] Family [facility] Office Visit Note    Subjective:       Patient ID: Dread Gustafson is a 28 y.o. male.    Chief Complaint: Follow-up and Hypertension      HPI:  28 y.o. male presents to [facility] clinic for BP check, also requesting testing for hiv, hepatitis    Forgot to bring BP log and machine. Home machine measures on upper arm. Noting some inconsistent readings at home - will have large variation in reading even when taken consecutively. Also noting that for past several weeks, has been drinking more green tea and coca-cola than usual. Usually avoids caffeine and drinks non-caffeinated soda    STI testing  - no recent exposure or change in partner. Just wants to know his status.     Review of Systems:  Gen: denies fever and chills  Resp: denies cough, shortness of breath and wheezing  CV: denies chest pain and palpitations  GI: denies nausea, vomiting, abdominal pain and change in bowel habits    Objective:      /89 (BP Location: Left arm, Patient Position: Sitting, BP Method: Large (Automatic))   Pulse 66   Temp 97.7 °F (36.5 °C) (Oral)   Ht 5' 11" (1.803 m)   Wt 93.9 kg (207 lb)   SpO2 98%   BMI 28.87 kg/m²     Physical Exam:  Gen: alert and oriented, NAD  CV: RRR, S1 and S2 present, no murmurs appreciated on exam  Resp: CTA bilaterally, breathing nonlabored on room air  Abd: Soft, non-distended, non-tender    Current Outpatient Medications   Medication Instructions    amLODIPine (NORVASC) 5 MG tablet TAKE ONE TABLET once a day    BLOOD PRESSURE CUFF Misc 1 each, Misc.(Non-Drug; Combo Route), Daily    fluticasone propionate (FLONASE) 50 mcg, Each Nostril, Daily    metoprolol succinate (TOPROL-XL) 25 mg, Oral, Daily    triamcinolone acetonide 0.1% (KENALOG) 0.1 % cream Topical (Top), 2 times daily    valsartan (DIOVAN) 160 MG tablet TAKE ONE TABLET once a day          Assessment/Plan:     1. Primary hypertension    2. Concern about STD in male without diagnosis    3. Screening " cholesterol level        Orders Placed This Encounter    Comprehensive Metabolic Panel    CBC Auto Differential    T4, Free    TSH    SYPHILIS ANTIBODY (WITH REFLEX RPR)    Hepatitis Panel, Acute    HIV 1/2 Ag/Ab (4th Gen)    Lipid Panel    CBC with Differential    RPR    Syphilis Ab, TP-PA     Above labs obtained. Given elevated read last visit, will check TFT. Cmp to check for electrolyte derangement 2/2 medication. BP improved, but still above goal given pt's age. Pt would like trial off soda and green tea. Instructed pt to bring BP machine to compare to office reading. For accuracy.    Pt declined urine tests or swabs for gc/ct; but was amenable to blood tests related to STI exposure.     Follow up in about 4 weeks (around 3/29/2024) for BP check after lifestyle.

## 2024-03-02 LAB
RPR SER QL: NORMAL
RPR SER-TITR: NORMAL {TITER}

## 2024-03-06 LAB — T PALLIDUM AB SER QL AGGL: POSITIVE

## 2024-03-07 ENCOUNTER — TELEPHONE (OUTPATIENT)
Dept: NEPHROLOGY | Facility: CLINIC | Age: 29
End: 2024-03-07
Payer: MEDICAID

## 2024-03-07 NOTE — TELEPHONE ENCOUNTER
Called pt with name and  verified - Hiv, hepatitis, syphilis without evidence of active infection. Pt with + syphilis Ab, RPR nonreactive. H/p syphilis s/p treatment. Of note, lipid panel with elevated cholesterol, . Given age, discussed lifestyle modifications and dietary changes in attempts to lower level. Plan to recheck level in 2-3m for reduction prior to initiation of statin therapy.

## 2024-04-09 ENCOUNTER — OFFICE VISIT (OUTPATIENT)
Dept: FAMILY MEDICINE | Facility: CLINIC | Age: 29
End: 2024-04-09
Payer: MEDICAID

## 2024-04-09 VITALS
SYSTOLIC BLOOD PRESSURE: 136 MMHG | HEIGHT: 71 IN | HEART RATE: 78 BPM | BODY MASS INDEX: 28.56 KG/M2 | WEIGHT: 204 LBS | TEMPERATURE: 98 F | RESPIRATION RATE: 18 BRPM | OXYGEN SATURATION: 98 % | DIASTOLIC BLOOD PRESSURE: 84 MMHG

## 2024-04-09 DIAGNOSIS — I10 ESSENTIAL (PRIMARY) HYPERTENSION: ICD-10-CM

## 2024-04-09 PROCEDURE — 99213 OFFICE O/P EST LOW 20 MIN: CPT | Mod: PBBFAC | Performed by: STUDENT IN AN ORGANIZED HEALTH CARE EDUCATION/TRAINING PROGRAM

## 2024-04-09 RX ORDER — AMLODIPINE BESYLATE 5 MG/1
TABLET ORAL
Qty: 90 TABLET | Refills: 1 | Status: SHIPPED | OUTPATIENT
Start: 2024-04-09

## 2024-04-09 RX ORDER — METOPROLOL SUCCINATE 25 MG/1
25 TABLET, EXTENDED RELEASE ORAL DAILY
Qty: 90 TABLET | Refills: 1 | Status: SHIPPED | OUTPATIENT
Start: 2024-04-09

## 2024-04-09 RX ORDER — VALSARTAN 160 MG/1
TABLET ORAL
Qty: 90 TABLET | Refills: 1 | Status: SHIPPED | OUTPATIENT
Start: 2024-04-09

## 2024-04-09 NOTE — PROGRESS NOTES
"[facility] Office Visit Note    Subjective:       Patient ID: Dread Gustafson is a 28 y.o. male.    Chief Complaint: primary hypertension      HPI:  28 y.o. male presents to [facility] clinic for BP check.    Initial reading 142/89, repeat at end of visit 136/84 Cut out soda. Made dietary changes to reduce fried foods. Currently taking Norvasc 5 mg, Metoprolol 25 mg, Valsartan 160 mg. Took meds this am. [facility]'marino [facility] due to young age of onset - no findings of renal etiology of HTN    Review of Systems:  Gen: denies fever and chills  Resp: denies cough, shortness of breath and wheezing  CV: denies chest pain and palpitations  GI: denies nausea, vomiting, abdominal pain and change in bowel habits    Objective:      /84   Pulse 78   Temp 98 °F (36.7 °C) (Oral)   Resp 18   Ht 5' 11" (1.803 m)   Wt 92.5 kg (204 lb)   SpO2 98%   BMI 28.45 kg/m²     Physical Exam:  Gen: alert and oriented, NAD  CV: RRR, S1 and S2 present, no murmurs appreciated on exam  Resp: CTA bilaterally, breathing nonlabored on room air  Abd: Soft, non-distended, non-tender, bowel sounds normoactive    Current Outpatient Medications   Medication Instructions    amLODIPine (NORVASC) 5 MG tablet TAKE ONE TABLET once a day    BLOOD PRESSURE CUFF Misc 1 each, Misc.(Non-Drug; Combo Route), Daily    fluticasone propionate (FLONASE) 50 mcg, Each Nostril, Daily    metoprolol succinate (TOPROL-XL) 25 mg, Oral, Daily    triamcinolone acetonide 0.1% (KENALOG) 0.1 % cream Topical (Top), 2 times daily    valsartan (DIOVAN) 160 MG tablet TAKE ONE TABLET once a day          Assessment/Plan:     1. Essential (primary) hypertension        Orders Placed This Encounter    amLODIPine (NORVASC) 5 MG tablet    metoprolol succinate (TOPROL-XL) 25 MG 24 hr tablet    valsartan (DIOVAN) 160 MG tablet     Cont to monitor BP for now. On review of medications, pt not on max dose any any medication; consider uptitrating amlodipine vs " changing metoprolol to coreg. BB therapy added by cardiology in the past due to h/o palpitations and findings of sinus tachycardia and SVT on 24hr holter monitor.     Follow up in about 3 months (around 7/9/2024) for hld, htn.

## 2024-04-11 NOTE — PROGRESS NOTES
Faculty Attestation: Dread Motason  was seen in [facility] Medicine Clinic. Patient chart reviewed. History of Present Illness, Physical Exam, and Assessment and Plan reviewed. Treatment plan is reasonable and appropriate. Compliance with treatment recommendations is important.         Fabi Fajardo MD  Sports Medicine

## 2024-04-17 ENCOUNTER — TELEPHONE (OUTPATIENT)
Dept: FAMILY MEDICINE | Facility: CLINIC | Age: 29
End: 2024-04-17
Payer: MEDICAID

## 2024-04-17 RX ORDER — MUPIROCIN 20 MG/G
OINTMENT TOPICAL 3 TIMES DAILY
Qty: 30 G | Refills: 0 | Status: SHIPPED | OUTPATIENT
Start: 2024-04-17

## 2024-04-17 RX ORDER — TRIAMCINOLONE ACETONIDE 1 MG/G
OINTMENT TOPICAL 2 TIMES DAILY
Qty: 15 G | Refills: 0 | Status: SHIPPED | OUTPATIENT
Start: 2024-04-17

## 2024-04-17 NOTE — TELEPHONE ENCOUNTER
----- Message from Mimi Hancock sent at 4/12/2024  2:46 PM CDT -----  Pt called and stated you told them you would send in an ointment to the pharmacy. (Mupirocin 2%) He ask that you please send it asap, thank you.

## 2025-01-24 ENCOUNTER — OFFICE VISIT (OUTPATIENT)
Dept: URGENT CARE | Facility: CLINIC | Age: 30
End: 2025-01-24

## 2025-01-24 VITALS
TEMPERATURE: 98 F | HEIGHT: 71 IN | SYSTOLIC BLOOD PRESSURE: 167 MMHG | RESPIRATION RATE: 18 BRPM | WEIGHT: 207.13 LBS | BODY MASS INDEX: 29 KG/M2 | OXYGEN SATURATION: 98 % | DIASTOLIC BLOOD PRESSURE: 108 MMHG | HEART RATE: 74 BPM

## 2025-01-24 DIAGNOSIS — S05.02XA ABRASION OF LEFT CORNEA, INITIAL ENCOUNTER: Primary | ICD-10-CM

## 2025-01-24 DIAGNOSIS — H10.12 ALLERGIC CONJUNCTIVITIS OF LEFT EYE: ICD-10-CM

## 2025-01-24 PROCEDURE — 99215 OFFICE O/P EST HI 40 MIN: CPT | Mod: PBBFAC

## 2025-01-24 PROCEDURE — 25000003 PHARM REV CODE 250

## 2025-01-24 PROCEDURE — 99213 OFFICE O/P EST LOW 20 MIN: CPT | Mod: S$PBB,,,

## 2025-01-24 RX ORDER — OLOPATADINE HYDROCHLORIDE 1 MG/ML
1 SOLUTION/ DROPS OPHTHALMIC 2 TIMES DAILY
Qty: 5 ML | Refills: 1 | Status: CANCELLED | OUTPATIENT
Start: 2025-01-24 | End: 2026-01-24

## 2025-01-24 RX ORDER — MOXIFLOXACIN 5 MG/ML
1 SOLUTION/ DROPS OPHTHALMIC 3 TIMES DAILY
Qty: 3 ML | Refills: 1 | Status: SHIPPED | OUTPATIENT
Start: 2025-01-24 | End: 2025-01-31

## 2025-01-24 RX ORDER — TETRACAINE HYDROCHLORIDE 5 MG/ML
2 SOLUTION OPHTHALMIC
Status: COMPLETED | OUTPATIENT
Start: 2025-01-24 | End: 2025-01-24

## 2025-01-24 RX ADMIN — FLUORESCEIN SODIUM 1 EACH: 1 STRIP OPHTHALMIC at 12:01

## 2025-01-24 RX ADMIN — TETRACAINE HYDROCHLORIDE 2 DROP: 5 SOLUTION OPHTHALMIC at 12:01

## 2025-01-24 NOTE — PROGRESS NOTES
"Subjective:       Patient ID: Dread Gustafson is a 29 y.o. male.    Vitals:  height is 5' 11" (1.803 m) and weight is 93.9 kg (207 lb 1.6 oz). His oral temperature is 98.3 °F (36.8 °C). His blood pressure is 167/108 (abnormal) and his pulse is 74. His respiration is 18 and oxygen saturation is 98%.     Chief Complaint: Eye Problem (Patient states L eye redness, itchy, painful to open x 1 day. States tried to rinse eye but no relief. Denies discharge.)    29-year-old male presents to the clinic with complaints of left eye redness, itchiness, sensitivity to light, and painful to open that began yesterday.  Patient states that he has tried to rinse out his eye multiple times with no relief and feels like there is something in his eye that is constantly scratching and irritating.  Patient denies discharge to eye, fever, blurriness, vision changes.    All other systems are negative    Chart reviewed    Objective:   Physical Exam   Eyes: Lids are normal. Pupils are equal, round, and reactive to light. Lids are everted and swept, no foreign bodies found. Left eye exhibits no exudate. Left conjunctiva is injected. Left conjunctiva has no hemorrhage.   Slit lamp exam:       The left eye shows corneal abrasion and fluorescein uptake.          Comments: Large corneal abrasion seen in left eye extending to left outer canthus, fluorescein uptake visualized on exam       Assessment:     1. Abrasion of left cornea, initial encounter    2. Allergic conjunctivitis of left eye            Plan:   Begin using Vigamox eyedrops today  The doctor probably used a medicine during your exam to numb your eye. When it wears off in 30 to 60 minutes, your eye pain may come back. Take pain medicines exactly as directed.  If the doctor gave you a prescription medicine for pain, take it as prescribed.  If you are not taking a prescription pain medicine, ask your doctor if you can take an over-the-counter medicine.  Do not take two or more pain " medicines at the same time unless the doctor told you to. Many pain medicines have acetaminophen, which is Tylenol. Too much acetaminophen (Tylenol) can be harmful.  Do not rub your injured eye. Rubbing can make it worse.  Use the prescribed eyedrops or ointment as directed. Be sure the dropper or bottle tip is clean. To put in eyedrops or ointment:  Tilt your head back, and pull your lower eyelid down with one finger.  Drop or squirt the medicine inside the lower lid.  Close your eye for 30 to 60 seconds to let the drops or ointment move around.  Do not touch the ointment or dropper tip to your eyelashes or any other surface.  Do not use your contact lens in your hurt eye until your doctor says you can. Also, do not wear eye makeup until your eye has healed.  Do not drive if you have blurred vision.  Bright light may hurt. Sunglasses can help.  To prevent eye injuries in the future, wear safety glasses or goggles when you work with machines or tools, mow the lawn, or ride a bike or motorcycle.    Call your doctor or nurse advice line now or seek immediate medical care if:  You have any changes in your vision, flashes of light, or floaters (shadows or dark objects that float across your field of vision).  Pain or redness gets worse, or there is yellow, green, or bloody pus coming from the eye.  You have a fever.     Abrasion of left cornea, initial encounter    Allergic conjunctivitis of left eye  -     TETRAcaine HCl (PF) 0.5 % Drop 2 drop  -     fluorescein ophthalmic strip 1 each    Other orders  -     moxifloxacin (VIGAMOX) 0.5 % ophthalmic solution; Place 1 drop into the left eye 3 (three) times daily. for 7 days  Dispense: 3 mL; Refill: 1        Please note: This chart was completed via voice to text dictation. It may contain typographical/word recognition errors. If there are any questions, please contact the provider for final clarification.

## 2025-03-13 ENCOUNTER — HOSPITAL ENCOUNTER (EMERGENCY)
Facility: HOSPITAL | Age: 30
Discharge: HOME | End: 2025-03-13
Attending: EMERGENCY MEDICINE
Payer: MEDICAID

## 2025-03-13 VITALS
HEART RATE: 88 BPM | BODY MASS INDEX: 28.32 KG/M2 | HEIGHT: 71 IN | DIASTOLIC BLOOD PRESSURE: 109 MMHG | SYSTOLIC BLOOD PRESSURE: 164 MMHG | TEMPERATURE: 99 F | OXYGEN SATURATION: 98 % | RESPIRATION RATE: 18 BRPM | WEIGHT: 202.31 LBS

## 2025-03-13 DIAGNOSIS — R52 PAIN: ICD-10-CM

## 2025-03-13 DIAGNOSIS — S93.601A SPRAIN OF RIGHT FOOT, INITIAL ENCOUNTER: ICD-10-CM

## 2025-03-13 DIAGNOSIS — M79.671 FOOT PAIN, RIGHT: Primary | ICD-10-CM

## 2025-03-13 PROCEDURE — 99284 EMERGENCY DEPT VISIT MOD MDM: CPT | Mod: 25

## 2025-03-13 PROCEDURE — 96372 THER/PROPH/DIAG INJ SC/IM: CPT

## 2025-03-13 PROCEDURE — 25000003 PHARM REV CODE 250

## 2025-03-13 PROCEDURE — 63600175 PHARM REV CODE 636 W HCPCS: Mod: JZ,TB

## 2025-03-13 RX ORDER — KETOROLAC TROMETHAMINE 10 MG/1
10 TABLET, FILM COATED ORAL 2 TIMES DAILY PRN
Qty: 10 TABLET | Refills: 0 | Status: SHIPPED | OUTPATIENT
Start: 2025-03-13 | End: 2025-03-18

## 2025-03-13 RX ORDER — VALSARTAN 80 MG/1
160 TABLET ORAL
Status: COMPLETED | OUTPATIENT
Start: 2025-03-13 | End: 2025-03-13

## 2025-03-13 RX ORDER — METHOCARBAMOL 500 MG/1
1000 TABLET, FILM COATED ORAL 3 TIMES DAILY
Qty: 30 TABLET | Refills: 0 | Status: SHIPPED | OUTPATIENT
Start: 2025-03-13 | End: 2025-03-18

## 2025-03-13 RX ORDER — METHOCARBAMOL 500 MG/1
1000 TABLET, FILM COATED ORAL
Status: COMPLETED | OUTPATIENT
Start: 2025-03-13 | End: 2025-03-13

## 2025-03-13 RX ORDER — KETOROLAC TROMETHAMINE 30 MG/ML
30 INJECTION, SOLUTION INTRAMUSCULAR; INTRAVENOUS
Status: COMPLETED | OUTPATIENT
Start: 2025-03-13 | End: 2025-03-13

## 2025-03-13 RX ORDER — AMLODIPINE BESYLATE 5 MG/1
5 TABLET ORAL
Status: COMPLETED | OUTPATIENT
Start: 2025-03-13 | End: 2025-03-13

## 2025-03-13 RX ADMIN — VALSARTAN 160 MG: 80 TABLET, FILM COATED ORAL at 10:03

## 2025-03-13 RX ADMIN — KETOROLAC TROMETHAMINE 30 MG: 30 INJECTION, SOLUTION INTRAMUSCULAR; INTRAVENOUS at 10:03

## 2025-03-13 RX ADMIN — METHOCARBAMOL TABLETS 1000 MG: 500 TABLET, COATED ORAL at 10:03

## 2025-03-13 RX ADMIN — AMLODIPINE BESYLATE 5 MG: 5 TABLET ORAL at 10:03

## 2025-03-13 NOTE — Clinical Note
"Dread Mota (Derek)son was seen and treated in our emergency department on 3/13/2025.  He may return to work on 03/17/2025.       If you have any questions or concerns, please don't hesitate to call.      Yanely MONTANO    "

## 2025-03-13 NOTE — ED PROVIDER NOTES
"Encounter Date: 3/13/2025       History     Chief Complaint   Patient presents with    Foot Pain     Pt reports he mis-stepped coming down some stairs on Wednesday with right foot and ankle pain now. "I must have sprained it". Has not taken his BP meds yet today. 175/125 (took 3 times). Denies any symptoms.      29-year-old male presenting to the emergency department with complaints of right foot pain for 1 day.  His pain began after he stepped off of a stare leaving his trailer.  His pain worsened throughout the day as he was walking.  This morning he woke up with mild swelling and pain with ambulation.  He has not taken anything for his foot pain.  He did try an Ace wrap that did not provide comfort, he did not put ice on his extremity.  He denies numbness, tingling, leg pain, leg swelling, loss of consciousness, lacerations, wounds, rashes.     The history is provided by the patient.     Review of patient's allergies indicates:  No Known Allergies  Past Medical History:   Diagnosis Date    Carotid artery occlusion     Hyperthyroidism     Syphilis, unspecified      History reviewed. No pertinent surgical history.  Family History   Problem Relation Name Age of Onset    Depression Mother      Hypertension Mother       Social History[1]  Review of Systems   Constitutional: Negative.    HENT: Negative.     Eyes: Negative.    Respiratory: Negative.     Cardiovascular: Negative.    Gastrointestinal: Negative.    Genitourinary: Negative.    Musculoskeletal:  Positive for arthralgias, gait problem and joint swelling. Negative for back pain, myalgias, neck pain and neck stiffness.   Skin: Negative.        Physical Exam     Initial Vitals [03/13/25 0935]   BP Pulse Resp Temp SpO2   (!) 175/125 89 18 98.5 °F (36.9 °C) 98 %      MAP       --         Physical Exam    Nursing note and vitals reviewed.  Constitutional: He appears well-developed and well-nourished. He is not diaphoretic. He is cooperative.  Non-toxic appearance. " He does not have a sickly appearance. He does not appear ill. No distress.   HENT:   Head: Normocephalic and atraumatic.   Right Ear: Hearing, tympanic membrane and external ear normal.   Left Ear: Hearing, tympanic membrane and external ear normal.   Nose: Nose normal. Mouth/Throat: Uvula is midline, oropharynx is clear and moist and mucous membranes are normal.   Eyes: Conjunctivae and EOM are normal. Pupils are equal, round, and reactive to light. No scleral icterus.   Neck: Trachea normal and phonation normal. Neck supple.   Normal range of motion.  Cardiovascular:  Normal rate, regular rhythm, normal heart sounds, intact distal pulses and normal pulses.           Pulmonary/Chest: Effort normal and breath sounds normal. No accessory muscle usage. No apnea, no tachypnea and no bradypnea. No respiratory distress. He has no decreased breath sounds. He has no wheezes. He has no rhonchi. He has no rales. He exhibits no tenderness.   Normal effort, symmetrical chest rise and fall, no accessory muscle use. Bilateral clear breath sounds in all fields anterior and posterior.      Abdominal: Abdomen is soft. Bowel sounds are normal. He exhibits no distension. There is no abdominal tenderness.   Abdomen is soft, nontender, no peritoneal signs. Tolerating PO fluids.      No right CVA tenderness.  No left CVA tenderness. There is no rebound, no guarding, no tenderness at McBurney's point and negative Lopez's sign. negative psoas sign and negative Rovsing's sign  Musculoskeletal:      Cervical back: Normal range of motion and neck supple.      Right foot: Decreased range of motion. Normal capillary refill. Swelling and tenderness present. No deformity or laceration. Normal pulse.      Left foot: Normal.        Legs:       Comments: Right dorsal medial tarsus surface  TTP,  mild swelling, , No erythema, no crepitus, no ecchymosis, lower extremity is pink, warm, sensation intact, +2 pedal pulse. Full ROM of right ankle and  right      Neurological: He is alert and oriented to person, place, and time. He has normal strength. Gait normal. GCS score is 15. GCS eye subscore is 4. GCS verbal subscore is 5. GCS motor subscore is 6.   Skin: Skin is warm and dry. Capillary refill takes less than 2 seconds. No rash and no abscess noted. No erythema.   Psychiatric: He has a normal mood and affect. His speech is normal and behavior is normal. Judgment and thought content normal. Cognition and memory are normal.         ED Course   Procedures  Labs Reviewed - No data to display       Imaging Results              X-Ray Foot Complete Right (Final result)  Result time 03/13/25 11:11:50      Final result by Anibal Cavanaugh MD (03/13/25 11:11:50)                   Impression:      No acute osseous process identified.      Electronically signed by: Anibal Cavanaugh  Date:    03/13/2025  Time:    11:11               Narrative:    EXAMINATION:  XR FOOT COMPLETE 3 VIEW RIGHT    CLINICAL HISTORY:  . Pain, unspecified    TECHNIQUE:  AP, lateral and oblique views of the right foot    COMPARISON:  None    FINDINGS:  No acute fracture identified.  Joint spaces are maintained with anatomic alignment.  No definitive findings for inflammatory arthropathy.                                       Medications   ketorolac injection 30 mg (30 mg Intramuscular Given 3/13/25 1019)   methocarbamoL tablet 1,000 mg (1,000 mg Oral Given 3/13/25 1019)   amLODIPine tablet 5 mg (5 mg Oral Given 3/13/25 1019)   valsartan tablet 160 mg (160 mg Oral Given 3/13/25 1019)     Medical Decision Making  Fracture, septic joint, cellulitis, abscess, gout, RA, OA among others    Right foot pain for 1 day after stepping off of a stare.  Patient did not take his blood pressure medicines this morning    Initial plan x-ray, pain control, home medications for his blood pressure.  The patient is a 29 y.o. male who presents to the [facility] Emergency Department with a chief complaint of foot pain.  EPIC  records were reviewed. Lab work was ordered and   reviewed. . A foot x-ray was also ordered and reviewed. Imaging shows no acute abnormalities.    The patient was provided with Toradol and Robaxin home medications.   The patient was discharged home with a diagnosis of right foot sprain. The patient was advised to rest. It was recommended for the patient to follow up with primary care.  The patient was provided prescriptions for rotate Tylenol and ibuprofen.  The patient's vital signs and condition remained stable while undergoing evaluation in the Emergency Department. The patient agreed with the plan for care.   The patient is nontoxic appearing, in no acute distress, with stable vital signs and resting comfortably. There is no objective evidence requiring urgent intervention or hospitalization. I provided counseling to the patient with regard to the medical condition, the treatment plan, specific conditions for return, and the importance of follow up. Detailed written and verbal instructions were provided to the patient and he/she expressed a verbal understanding of the discharge instructions and overall management plan. Reiterated the importance of medication administration and safety, advised the patient to follow up with primary care provider in 3-5 days or sooner if needed. All questions and concerns were addressed before leaving the Emergency Department. The patient is stable for discharge.       Amount and/or Complexity of Data Reviewed  Radiology: ordered. Decision-making details documented in ED Course.    Risk  Prescription drug management.               ED Course as of 03/13/25 1740   Thu Mar 13, 2025   1143 X-Ray Foot Complete Right  No acute fracture identified.  Joint spaces are maintained with anatomic alignment.  No definitive findings for inflammatory arthropathy.     Impression:     No acute osseous process identified.   [RQ]      ED Course User Index  [RQ] Kimberly Lopez FNP                            Clinical Impression:  Final diagnoses:  [R52] Pain  [M79.671] Foot pain, right (Primary)  [S93.601A] Sprain of right foot, initial encounter          ED Disposition Condition    Discharge Stable          ED Prescriptions       Medication Sig Dispense Start Date End Date Auth. Provider    ketorolac (TORADOL) 10 mg tablet Take 1 tablet (10 mg total) by mouth 2 (two) times daily as needed. 10 tablet 3/13/2025 3/18/2025 Kimberly Lopez FNP    methocarbamoL (ROBAXIN) 500 MG Tab Take 2 tablets (1,000 mg total) by mouth 3 (three) times daily. for 5 days 30 tablet 3/13/2025 3/18/2025 Kimberly Lopez FNP          Follow-up Information       Follow up With Specialties Details Why Contact Info    Henrry Garrido MD Family Medicine In 1 week  [address] [facility] [address]  [phone]      [facility] Emergency Medicine  If symptoms worsen [address] [facility] [address] [phone]                 [1]   Social History  Tobacco Use    Smoking status: Never    Smokeless tobacco: Never   Substance Use Topics    Alcohol use: Not Currently    Drug use: Yes     Frequency: 7.0 times per week     Types: Marijuana        Kimberly Lopez FNP  03/13/25 [id number]

## 2025-03-13 NOTE — DISCHARGE INSTRUCTIONS
Rest Ice Compression Elevate  Persistent pain greater than 7-10 days or increased pain requires follow up.

## 2025-08-13 ENCOUNTER — OFFICE VISIT (OUTPATIENT)
Dept: FAMILY MEDICINE | Facility: CLINIC | Age: 30
End: 2025-08-13

## 2025-08-13 VITALS
SYSTOLIC BLOOD PRESSURE: 145 MMHG | WEIGHT: 206.63 LBS | RESPIRATION RATE: 18 BRPM | OXYGEN SATURATION: 99 % | HEART RATE: 78 BPM | HEIGHT: 71 IN | TEMPERATURE: 98 F | DIASTOLIC BLOOD PRESSURE: 80 MMHG | BODY MASS INDEX: 28.93 KG/M2

## 2025-08-13 DIAGNOSIS — Z71.1 CONCERN ABOUT STD IN MALE WITHOUT DIAGNOSIS: Primary | ICD-10-CM

## 2025-08-13 DIAGNOSIS — I10 ESSENTIAL (PRIMARY) HYPERTENSION: ICD-10-CM

## 2025-08-13 LAB
HAV IGM SERPL QL IA: NONREACTIVE
HBV CORE IGM SERPL QL IA: NONREACTIVE
HBV SURFACE AG SERPL QL IA: NONREACTIVE
HCV AB SERPL QL IA: NONREACTIVE
HIV 1+2 AB+HIV1 P24 AG SERPL QL IA: NONREACTIVE
RPR SER QL: NORMAL
T PALLIDUM AB SER QL: REACTIVE

## 2025-08-13 PROCEDURE — 87389 HIV-1 AG W/HIV-1&-2 AB AG IA: CPT

## 2025-08-13 PROCEDURE — 86780 TREPONEMA PALLIDUM: CPT | Mod: 59

## 2025-08-13 PROCEDURE — 80074 ACUTE HEPATITIS PANEL: CPT

## 2025-08-13 PROCEDURE — 99214 OFFICE O/P EST MOD 30 MIN: CPT | Mod: PBBFAC

## 2025-08-13 PROCEDURE — 86780 TREPONEMA PALLIDUM: CPT

## 2025-08-13 PROCEDURE — 86592 SYPHILIS TEST NON-TREP QUAL: CPT

## 2025-08-13 RX ORDER — AMLODIPINE BESYLATE 5 MG/1
TABLET ORAL
Qty: 90 TABLET | Refills: 3 | Status: SHIPPED | OUTPATIENT
Start: 2025-08-13

## 2025-08-13 RX ORDER — VALSARTAN 160 MG/1
TABLET ORAL
Qty: 90 TABLET | Refills: 3 | Status: SHIPPED | OUTPATIENT
Start: 2025-08-13

## 2025-08-13 RX ORDER — TRIAMCINOLONE ACETONIDE 1 MG/G
OINTMENT TOPICAL 2 TIMES DAILY
Qty: 15 G | Refills: 1 | Status: SHIPPED | OUTPATIENT
Start: 2025-08-13

## 2025-08-13 RX ORDER — METOPROLOL SUCCINATE 25 MG/1
25 TABLET, EXTENDED RELEASE ORAL DAILY
Qty: 90 TABLET | Refills: 3 | Status: SHIPPED | OUTPATIENT
Start: 2025-08-13

## 2025-08-15 LAB — T PALLIDUM AB SER QL AGGL: POSITIVE
